# Patient Record
Sex: FEMALE | URBAN - METROPOLITAN AREA
[De-identification: names, ages, dates, MRNs, and addresses within clinical notes are randomized per-mention and may not be internally consistent; named-entity substitution may affect disease eponyms.]

---

## 2021-02-04 ENCOUNTER — OFFICE (OUTPATIENT)
Dept: URBAN - METROPOLITAN AREA CLINIC 5 | Facility: CLINIC | Age: 36
End: 2021-02-04
Payer: OTHER GOVERNMENT

## 2021-02-04 VITALS — HEIGHT: 67 IN | WEIGHT: 136 LBS

## 2021-02-04 DIAGNOSIS — R10.11 RIGHT UPPER QUADRANT PAIN: ICD-10-CM

## 2021-02-04 PROCEDURE — 99203 OFFICE O/P NEW LOW 30 MIN: CPT | Performed by: INTERNAL MEDICINE

## 2021-02-04 RX ORDER — PANTOPRAZOLE SODIUM 40 MG/1
40 TABLET, DELAYED RELEASE ORAL
Qty: 30 | Refills: 6 | Status: COMPLETED
Start: 2021-02-04 | End: 2021-07-15

## 2021-04-09 VITALS
DIASTOLIC BLOOD PRESSURE: 90 MMHG | SYSTOLIC BLOOD PRESSURE: 118 MMHG | HEART RATE: 95 BPM | DIASTOLIC BLOOD PRESSURE: 87 MMHG | HEART RATE: 85 BPM | SYSTOLIC BLOOD PRESSURE: 130 MMHG | HEART RATE: 114 BPM | WEIGHT: 130 LBS | DIASTOLIC BLOOD PRESSURE: 91 MMHG | OXYGEN SATURATION: 97 % | HEART RATE: 87 BPM | OXYGEN SATURATION: 100 % | RESPIRATION RATE: 11 BRPM | SYSTOLIC BLOOD PRESSURE: 108 MMHG | DIASTOLIC BLOOD PRESSURE: 66 MMHG | RESPIRATION RATE: 16 BRPM | RESPIRATION RATE: 9 BRPM | DIASTOLIC BLOOD PRESSURE: 68 MMHG | SYSTOLIC BLOOD PRESSURE: 123 MMHG | HEART RATE: 92 BPM | HEART RATE: 84 BPM | SYSTOLIC BLOOD PRESSURE: 105 MMHG | HEART RATE: 71 BPM | HEIGHT: 67 IN | DIASTOLIC BLOOD PRESSURE: 67 MMHG | RESPIRATION RATE: 13 BRPM | TEMPERATURE: 97.7 F | TEMPERATURE: 96.8 F | SYSTOLIC BLOOD PRESSURE: 129 MMHG | HEART RATE: 120 BPM

## 2021-04-13 ENCOUNTER — OFFICE (OUTPATIENT)
Dept: URBAN - METROPOLITAN AREA PATHOLOGY 4 | Facility: PATHOLOGY | Age: 36
End: 2021-04-13
Payer: OTHER GOVERNMENT

## 2021-04-13 ENCOUNTER — AMBULATORY SURGICAL CENTER (OUTPATIENT)
Dept: URBAN - METROPOLITAN AREA SURGERY 17 | Facility: SURGERY | Age: 36
End: 2021-04-13
Payer: OTHER GOVERNMENT

## 2021-04-13 DIAGNOSIS — K31.89 OTHER DISEASES OF STOMACH AND DUODENUM: ICD-10-CM

## 2021-04-13 DIAGNOSIS — K29.70 GASTRITIS, UNSPECIFIED, WITHOUT BLEEDING: ICD-10-CM

## 2021-04-13 DIAGNOSIS — R10.11 RIGHT UPPER QUADRANT PAIN: ICD-10-CM

## 2021-04-13 LAB
GI HISTOLOGY: A. UNSPECIFIED: (no result)
GI HISTOLOGY: B. UNSPECIFIED: (no result)
GI HISTOLOGY: C. UNSPECIFIED: (no result)
GI HISTOLOGY: PDF REPORT: (no result)

## 2021-04-13 PROCEDURE — 88305 TISSUE EXAM BY PATHOLOGIST: CPT | Performed by: INTERNAL MEDICINE

## 2021-04-13 PROCEDURE — 43239 EGD BIOPSY SINGLE/MULTIPLE: CPT | Performed by: INTERNAL MEDICINE

## 2021-04-13 PROCEDURE — 88342 IMHCHEM/IMCYTCHM 1ST ANTB: CPT | Performed by: INTERNAL MEDICINE

## 2021-05-04 ENCOUNTER — HOSPITAL ENCOUNTER (OUTPATIENT)
Dept: CT IMAGING | Facility: HOSPITAL | Age: 36
Discharge: HOME | End: 2021-05-04

## 2021-07-15 ENCOUNTER — OFFICE (OUTPATIENT)
Dept: URBAN - METROPOLITAN AREA CLINIC 75 | Facility: CLINIC | Age: 36
End: 2021-07-15
Payer: OTHER GOVERNMENT

## 2021-07-15 VITALS
HEART RATE: 73 BPM | RESPIRATION RATE: 12 BRPM | OXYGEN SATURATION: 98 % | HEIGHT: 67 IN | TEMPERATURE: 97 F | DIASTOLIC BLOOD PRESSURE: 72 MMHG | SYSTOLIC BLOOD PRESSURE: 102 MMHG | WEIGHT: 122 LBS

## 2021-07-15 DIAGNOSIS — R10.11 RIGHT UPPER QUADRANT PAIN: ICD-10-CM

## 2021-07-15 PROCEDURE — 99213 OFFICE O/P EST LOW 20 MIN: CPT | Performed by: INTERNAL MEDICINE

## 2021-09-16 ENCOUNTER — INITIAL PRENATAL (OUTPATIENT)
Dept: OBSTETRICS AND GYNECOLOGY | Facility: CLINIC | Age: 36
End: 2021-09-16

## 2021-09-16 VITALS
WEIGHT: 131 LBS | BODY MASS INDEX: 20.56 KG/M2 | HEIGHT: 67 IN | SYSTOLIC BLOOD PRESSURE: 110 MMHG | DIASTOLIC BLOOD PRESSURE: 70 MMHG

## 2021-09-16 DIAGNOSIS — Z34.80 SUPERVISION OF OTHER NORMAL PREGNANCY, ANTEPARTUM: ICD-10-CM

## 2021-09-16 DIAGNOSIS — O09.529 ANTEPARTUM MULTIGRAVIDA OF ADVANCED MATERNAL AGE: ICD-10-CM

## 2021-09-16 DIAGNOSIS — Z34.80 SUPERVISION OF OTHER NORMAL PREGNANCY: ICD-10-CM

## 2021-09-16 DIAGNOSIS — Z32.01 PREGNANCY TEST PERFORMED, PREGNANCY CONFIRMED: Primary | ICD-10-CM

## 2021-09-16 LAB
ABO GROUP BLD: NORMAL
B-HCG UR QL: POSITIVE
BASOPHILS # BLD AUTO: 0.04 10*3/MM3 (ref 0–0.2)
BASOPHILS NFR BLD AUTO: 0.4 % (ref 0–1.5)
BLD GP AB SCN SERPL QL: NEGATIVE
C TRACH RRNA CVX QL NAA+PROBE: NOT DETECTED
DEPRECATED RDW RBC AUTO: 42.8 FL (ref 37–54)
EOSINOPHIL # BLD AUTO: 0.18 10*3/MM3 (ref 0–0.4)
EOSINOPHIL NFR BLD AUTO: 1.6 % (ref 0.3–6.2)
ERYTHROCYTE [DISTWIDTH] IN BLOOD BY AUTOMATED COUNT: 13.2 % (ref 12.3–15.4)
GLUCOSE UR STRIP-MCNC: NEGATIVE MG/DL
HBV SURFACE AG SERPL QL IA: NORMAL
HCT VFR BLD AUTO: 41.3 % (ref 34–46.6)
HCV AB SER DONR QL: NORMAL
HGB BLD-MCNC: 13.5 G/DL (ref 12–15.9)
HIV1+2 AB SER QL: NORMAL
IMM GRANULOCYTES # BLD AUTO: 0.04 10*3/MM3 (ref 0–0.05)
IMM GRANULOCYTES NFR BLD AUTO: 0.4 % (ref 0–0.5)
INTERNAL NEGATIVE CONTROL: ABNORMAL
INTERNAL POSITIVE CONTROL: ABNORMAL
LYMPHOCYTES # BLD AUTO: 1.96 10*3/MM3 (ref 0.7–3.1)
LYMPHOCYTES NFR BLD AUTO: 17.6 % (ref 19.6–45.3)
Lab: ABNORMAL
MCH RBC QN AUTO: 29.1 PG (ref 26.6–33)
MCHC RBC AUTO-ENTMCNC: 32.7 G/DL (ref 31.5–35.7)
MCV RBC AUTO: 89 FL (ref 79–97)
MONOCYTES # BLD AUTO: 0.57 10*3/MM3 (ref 0.1–0.9)
MONOCYTES NFR BLD AUTO: 5.1 % (ref 5–12)
N GONORRHOEA RRNA SPEC QL NAA+PROBE: NOT DETECTED
NEUTROPHILS NFR BLD AUTO: 74.9 % (ref 42.7–76)
NEUTROPHILS NFR BLD AUTO: 8.36 10*3/MM3 (ref 1.7–7)
NRBC BLD AUTO-RTO: 0 /100 WBC (ref 0–0.2)
PLATELET # BLD AUTO: 370 10*3/MM3 (ref 140–450)
PMV BLD AUTO: 9.6 FL (ref 6–12)
PROT UR STRIP-MCNC: NEGATIVE MG/DL
RBC # BLD AUTO: 4.64 10*6/MM3 (ref 3.77–5.28)
RH BLD: POSITIVE
WBC # BLD AUTO: 11.15 10*3/MM3 (ref 3.4–10.8)

## 2021-09-16 PROCEDURE — 87491 CHLMYD TRACH DNA AMP PROBE: CPT | Performed by: STUDENT IN AN ORGANIZED HEALTH CARE EDUCATION/TRAINING PROGRAM

## 2021-09-16 PROCEDURE — 0501F PRENATAL FLOW SHEET: CPT | Performed by: STUDENT IN AN ORGANIZED HEALTH CARE EDUCATION/TRAINING PROGRAM

## 2021-09-16 PROCEDURE — 86803 HEPATITIS C AB TEST: CPT | Performed by: STUDENT IN AN ORGANIZED HEALTH CARE EDUCATION/TRAINING PROGRAM

## 2021-09-16 PROCEDURE — 81025 URINE PREGNANCY TEST: CPT | Performed by: STUDENT IN AN ORGANIZED HEALTH CARE EDUCATION/TRAINING PROGRAM

## 2021-09-16 PROCEDURE — G0432 EIA HIV-1/HIV-2 SCREEN: HCPCS | Performed by: STUDENT IN AN ORGANIZED HEALTH CARE EDUCATION/TRAINING PROGRAM

## 2021-09-16 PROCEDURE — 87086 URINE CULTURE/COLONY COUNT: CPT | Performed by: STUDENT IN AN ORGANIZED HEALTH CARE EDUCATION/TRAINING PROGRAM

## 2021-09-16 PROCEDURE — 87591 N.GONORRHOEAE DNA AMP PROB: CPT | Performed by: STUDENT IN AN ORGANIZED HEALTH CARE EDUCATION/TRAINING PROGRAM

## 2021-09-16 NOTE — PROGRESS NOTES
"OB Initial Visit    CC- Here for care of current pregnancy     GAEL Finalized: Will get early dating scan.    Subjective:  36 y.o.  presenting for her first obstetrical visit.    LMP: Patient's last menstrual period was 2021. sure    COMPLAINS OF: Pt has no complaints, is doing well    Niesha is a 36-year-old -0-1-3 presented today for her first obstetrical visit.  Patient found out she was pregnant incidentally after taking pregnancy test at home with missed menses.  She denies any complications to date.  No vaginal bleeding, no vaginal discharge, denies nausea or vomiting.  Does have occasional lightheadedness when standing however denies any headaches and resolves after short period time.  This is her fifth pregnancy, she has delivered 3 term spontaneous vaginal deliveries and had 1 early miscarriage at 5 weeks between her second and third child.  Her family is in the  and she has recently moved back from [facility].  She denies any complications in those previous pregnancies and antepartum timeframe or afterwards.  She has been taking a prenatal vitamin.  She denies any significant history medically, or surgically.     Reviewed and updated:  OBHx, GYNHx (STDs), PMHx, Medications, Allergies, PSHx, Social Hx, Preventative Hx (PAP), Hx of abuse/safe environment, Vaccine Hx including hx of chickenpox or vaccine, Genetic Hx (pt, FOB, both families).        Objective:  /70   Ht 170.2 cm (67\")   Wt 59.4 kg (131 lb)   LMP 2021   BMI 20.52 kg/m²   General- NAD, alert and oriented, appropriate  Psych- Normal mood, good memory  Neck- No masses, no thyroid enlargement  CV- Regular rhythm, no murnurs  Resp- CTA to bases, no wheezes  Abdomen- Soft, non distended, non tender, no masses     Breast left- No mass, non tender, no nipple discharge  Breast right- No mass, non tender, no nipple discharge    External genitalia- Normal, no lesions  Urethra- Normal, no masses, non " tender  Vagina- Normal, no discharge  Bladder- Normal, no masses, non tender  Cvx- Normal, no lesions, no discharge, no CMT  Uterus- Normal shape and consistency, non tender  Adnexa- Normal, no mass, non tender    Lymphatic- No palpable neck, axillary, or groin nodes  Ext- No edema, no cyanosis    Skin- No lesions, no rashes, no acanthosis nigricans      Assessment and Plan:  Impression  1.  First trimester pregnancy  2.  History of 3 prior spontaneous vaginal deliveries at term  3.  Advanced maternal age    Plan    Diagnoses and all orders for this visit:    1. Pregnancy test performed, pregnancy confirmed (Primary)  -     POC Pregnancy, Urine    2. Supervision of other normal pregnancy  -     POC Urinalysis Dipstick  -     Urine Culture - Urine, Urine, Clean Catch  -     OB Panel With HIV  -     Chlamydia / GC, DNA Probe - Swab, Cervix, Vagina  -     US Ob < 14 Weeks Single or First Gestation; Future    3. Supervision of other normal pregnancy, antepartum  Overview:  Initial visit:  • PNL: Drawn   • PAP/GC/CT/Urine culture: \ normal history of Pap smear last 2018 in [place].  Patient to provide records.  • Blood type:    Genetic testing:    • Declined genetic testing, discussed increased risk of aneuploidy with advanced maternal age.    Vaccinations:  • COVID: History of Covid, declines vaccination at this time  • Influenza:   • Tdap:    24-28 weeks:  • 1hr GCT:  • Hct/Plt:  • Tdap Rx  • Rhogam:     Third Trimester:  • GBS  • Breast pump:    Ultrasound:  • Dating: Dating ultrasound ordered  • Anatomy:   • Follow up:     PLAN:  2021 routine prenatal care at this time.  Consider  testing at 32 weeks for advanced maternal age            4. Antepartum multigravida of advanced maternal age    Other orders  -     Cancel: IGP,CtNgTv,rfx Aptima HPV ASCU          13w1d    Genetic Screening: Declined genetic testing after counseling of increased risk for breast maternal age.    Vaccines: APVACCINEHX:  Patient with history of Covid, not interested in vaccination at this time.    Counseling: Nutrition discussed, calories, activity/exercise in pregnancy, Discussed dietary restrictions/safety food preparation in pregnancy, Reviewed what to expect prenatal visits, office providers, Appropriate trimester precautions provided, N/V, vag bleeding, cramping, Questions answered            Alejandro Crews MD  09/16/2021

## 2021-09-17 ENCOUNTER — TELEPHONE (OUTPATIENT)
Dept: OBSTETRICS AND GYNECOLOGY | Facility: CLINIC | Age: 36
End: 2021-09-17

## 2021-09-17 DIAGNOSIS — O26.842 UTERINE SIZE-DATE DISCREPANCY IN SECOND TRIMESTER: Primary | ICD-10-CM

## 2021-09-17 LAB
BACTERIA SPEC AEROBE CULT: NO GROWTH
RPR SER QL: NORMAL
RUBV IGG SERPL IA-ACNC: 1 INDEX

## 2021-09-27 ENCOUNTER — HOSPITAL ENCOUNTER (OUTPATIENT)
Dept: ULTRASOUND IMAGING | Facility: HOSPITAL | Age: 36
Discharge: HOME | End: 2021-09-27
Admitting: STUDENT IN AN ORGANIZED HEALTH CARE EDUCATION/TRAINING PROGRAM

## 2021-09-27 DIAGNOSIS — O26.842 UTERINE SIZE-DATE DISCREPANCY IN SECOND TRIMESTER: ICD-10-CM

## 2021-09-27 PROCEDURE — 76805 OB US >/= 14 WKS SNGL FETUS: CPT

## 2021-10-14 ENCOUNTER — ROUTINE PRENATAL (OUTPATIENT)
Dept: OBSTETRICS AND GYNECOLOGY | Facility: CLINIC | Age: 36
End: 2021-10-14

## 2021-10-14 VITALS — SYSTOLIC BLOOD PRESSURE: 111 MMHG | DIASTOLIC BLOOD PRESSURE: 76 MMHG | BODY MASS INDEX: 21.14 KG/M2 | WEIGHT: 135 LBS

## 2021-10-14 DIAGNOSIS — Z3A.16 16 WEEKS GESTATION OF PREGNANCY: Primary | ICD-10-CM

## 2021-10-14 DIAGNOSIS — O09.522 MULTIGRAVIDA OF ADVANCED MATERNAL AGE IN SECOND TRIMESTER: ICD-10-CM

## 2021-10-14 DIAGNOSIS — Z34.80 SUPERVISION OF OTHER NORMAL PREGNANCY, ANTEPARTUM: ICD-10-CM

## 2021-10-14 LAB
GLUCOSE UR STRIP-MCNC: NEGATIVE MG/DL
PROT UR STRIP-MCNC: NEGATIVE MG/DL

## 2021-10-14 PROCEDURE — 0502F SUBSEQUENT PRENATAL CARE: CPT | Performed by: STUDENT IN AN ORGANIZED HEALTH CARE EDUCATION/TRAINING PROGRAM

## 2021-10-14 NOTE — PROGRESS NOTES
OB FOLLOW UP  Complaint   Chief Complaint   Patient presents with   • Routine Prenatal Visit            Niesha Ro is a 36 y.o.  18w2d patient being seen today for her obstetrical follow up visit. Patient denies decreased fetal movement, contractions, loss of fluid or vaginal bleeding.  No other acute complaints.     Her prenatal care is complicated by (and status) :    Patient Active Problem List   Diagnosis   • Supervision of other normal pregnancy, antepartum   • AMA (advanced maternal age) multigravida 35+       All other systems reviewed and are negative.     The additional following portions of the patient's history were reviewed and updated as appropriate: allergies, current medications, past family history, past medical history, past social history, past surgical history and problem list.      EXAM:     Vital signs: /76   Wt 61.2 kg (135 lb)   LMP 2021   BMI 21.14 kg/m²   Appearance/psychiatric: To be in no distress  Constitutional: The patient is well nourished.  Cardiovascular: She does not have edema.  Respiratory: Respiratory effort is normal.  Gastrointestinal: Abdomen is soft, gravid, nontender, no rashes, heart tones are present, fundal height is size equals dates    Pelvic Exam: No    Urine glucose/protein: See prenatal flowsheet       Assessment and Plan    Problem List Items Addressed This Visit        Other    AMA (advanced maternal age) multigravida 35+    Supervision of other normal pregnancy, antepartum    Overview     Initial visit:  • PNL: wnl  • PAP/GC/CT/Urine culture: NILM 2018 in [place].  Patient to provide records. GCCT neg; NGTD  • Blood type:A+/neg    Genetic testing:    • Declined genetic testing, discussed increased risk of aneuploidy with advanced maternal age.    Vaccinations:  • COVID: History of Covid, declines vaccination at this time  • Influenza:   • Tdap:    24-28 weeks:  • 1hr GCT:  • Hct/Plt:  • Tdap Rx  • Rhogam:     Third  Trimester:  • GBS  • Breast pump:    Ultrasound:  • Dating: U/S at 14w5d, >7 days difference. Dating by 14 week U/S  • Anatomy:   • Follow up:     PLAN:  2021 routine prenatal care at this time.  Consider  testing at 32 weeks for advanced maternal age                 Other Visit Diagnoses     16 weeks gestation of pregnancy    -  Primary    Relevant Orders    POC Urinalysis Dipstick (Completed)    US Ob Detail Fetal Anatomy Single or First Gestation          Impression  1. Pregnancy at 18w2d  2. Fetal status reassuring.   3. Activity and Exercise discussed.    Plan  1. Anatomy scan ordered  2. Review of dating scan with patient with change from LMP to U/S      Patient was counseled to the following pregnancy precautions:  • Decreased fetal movement, if concern for decreased fetal movement please perform fetal kick counts you are looking for 10 movements in 2 hours.  If concern for fetal movement and not meeting that criteria, please present to triage for evaluation.  • Contractions occurring every 5 minutes for over an hour, lasting 30 to 60 seconds and progressively causing more discomfort, please seek medical attention to rule out labor  • If you believe that your water is broken, place a sanitary pad.  If pad fills in short period of time i.e. less than 5 minutes, take off pad placed another pad.  If this is saturated please present for rule out rupture of membranes  • Vaginal bleeding can be normal in pregnancy, this usually takes a form of spotting.  If having heavier bleeding like a menstrual period please present for evaluation; especially in light of severe abdominal pain this could represent a placental abruption.  • Keep all scheduled appointments as recommended.    Follow up in 4 weeks    Alejandro Crews MD  10/14/2021

## 2021-11-11 ENCOUNTER — ROUTINE PRENATAL (OUTPATIENT)
Dept: OBSTETRICS AND GYNECOLOGY | Facility: CLINIC | Age: 36
End: 2021-11-11

## 2021-11-11 VITALS — WEIGHT: 138 LBS | BODY MASS INDEX: 21.61 KG/M2 | SYSTOLIC BLOOD PRESSURE: 109 MMHG | DIASTOLIC BLOOD PRESSURE: 73 MMHG

## 2021-11-11 DIAGNOSIS — Z34.80 SUPERVISION OF OTHER NORMAL PREGNANCY, ANTEPARTUM: Primary | ICD-10-CM

## 2021-11-11 DIAGNOSIS — O09.522 MULTIGRAVIDA OF ADVANCED MATERNAL AGE IN SECOND TRIMESTER: ICD-10-CM

## 2021-11-11 PROBLEM — R42 DIZZINESS: Status: ACTIVE | Noted: 2021-11-11

## 2021-11-11 LAB
GLUCOSE UR STRIP-MCNC: NEGATIVE MG/DL
PROT UR STRIP-MCNC: NEGATIVE MG/DL

## 2021-11-11 PROCEDURE — 0502F SUBSEQUENT PRENATAL CARE: CPT | Performed by: STUDENT IN AN ORGANIZED HEALTH CARE EDUCATION/TRAINING PROGRAM

## 2021-11-11 RX ORDER — PRENATAL VIT/IRON FUM/FOLIC AC 27MG-0.8MG
1 TABLET ORAL DAILY
COMMUNITY

## 2021-11-11 NOTE — PROGRESS NOTES
OB FOLLOW UP  Complaint   Chief Complaint   Patient presents with   • Routine Prenatal Visit            Niesha Ro is a 36 y.o.  22w2d patient being seen today for her obstetrical follow up visit. Patient denies decreased fetal movement, contractions, loss of fluid or vaginal bleeding.  Episodes of dizziness, resolved without intervention.    Her prenatal care is complicated by (and status) :    Patient Active Problem List   Diagnosis   • Supervision of other normal pregnancy, antepartum   • AMA (advanced maternal age) multigravida 35+       All other systems reviewed and are negative.     The additional following portions of the patient's history were reviewed and updated as appropriate: allergies, current medications, past family history, past medical history, past social history, past surgical history and problem list.      EXAM:     Vital signs: /73   Wt 62.6 kg (138 lb)   LMP 2021   BMI 21.61 kg/m²   Appearance/psychiatric: To be in no distress  Constitutional: The patient is well nourished.  Cardiovascular: She does not have edema.  Respiratory: Respiratory effort is normal.  Gastrointestinal: Abdomen is soft, gravid, nontender, no rashes, heart tones are present, fundal height is size equals dates    Pelvic Exam: No    Urine glucose/protein: See prenatal flowsheet       Assessment and Plan    Problem List Items Addressed This Visit        Gravid and     Supervision of other normal pregnancy, antepartum - Primary    Overview     Initial visit:  • PNL: wnl  • PAP/GC/CT/Urine culture: NILM 2018 in [place].  Patient to provide records. GCCT neg; NGTD  • Blood type:A+/neg    Genetic testing:    • Declined genetic testing, discussed increased risk of aneuploidy with advanced maternal age.    Vaccinations:  • COVID: History of Covid, declines vaccination at this time  • Influenza:   • Tdap:    24-28 weeks:  • 1hr GCT:  • Hct/Plt:  • Tdap Rx  • Rhogam:     Third  Trimester:  • GBS  • Breast pump:    Ultrasound:  • Dating: U/S at 14w5d, >7 days difference. Dating by 14 week U/S  • Anatomy:  2021 EFW 34th percentile; normal anatomy, XY, anterior placenta, cephalic three-vessel cord CRYSTAL within normal limits.  Cervical length 47 mm no funneling  • Follow up:     PLAN:  2021 routine prenatal care at this time.  Consider  testing at 32 weeks for advanced maternal age               Relevant Orders    POC Urinalysis Dipstick (Completed)          Impression  1. Pregnancy at 22w2d  2. Fetal status reassuring.   3. Activity and Exercise discussed.    Plan  1.  Review of anatomy ultrasound with patient, have a baby boy.  2.  Recommend hydration and keeping small snack with patient.  To present for evaluation of have loss of consciousness with dizziness spells.      Patient was counseled to the following pregnancy precautions:  • Decreased fetal movement, if concern for decreased fetal movement please perform fetal kick counts you are looking for 10 movements in 2 hours.  If concern for fetal movement and not meeting that criteria, please present to triage for evaluation.  • Contractions occurring every 5 minutes for over an hour, lasting 30 to 60 seconds and progressively causing more discomfort, please seek medical attention to rule out labor  • If you believe that your water is broken, place a sanitary pad.  If pad fills in short period of time i.e. less than 5 minutes, take off pad placed another pad.  If this is saturated please present for rule out rupture of membranes  • Vaginal bleeding can be normal in pregnancy, this usually takes a form of spotting.  If having heavier bleeding like a menstrual period please present for evaluation; especially in light of severe abdominal pain this could represent a placental abruption.  • Keep all scheduled appointments as recommended.        Alejandro Crews MD  2021

## 2021-12-06 ENCOUNTER — ROUTINE PRENATAL (OUTPATIENT)
Dept: OBSTETRICS AND GYNECOLOGY | Facility: CLINIC | Age: 36
End: 2021-12-06

## 2021-12-06 VITALS — DIASTOLIC BLOOD PRESSURE: 85 MMHG | SYSTOLIC BLOOD PRESSURE: 124 MMHG | BODY MASS INDEX: 22.55 KG/M2 | WEIGHT: 144 LBS

## 2021-12-06 DIAGNOSIS — O09.522 MULTIGRAVIDA OF ADVANCED MATERNAL AGE IN SECOND TRIMESTER: ICD-10-CM

## 2021-12-06 DIAGNOSIS — R42 DIZZINESS: ICD-10-CM

## 2021-12-06 DIAGNOSIS — Z34.80 SUPERVISION OF OTHER NORMAL PREGNANCY, ANTEPARTUM: ICD-10-CM

## 2021-12-06 LAB
DEPRECATED RDW RBC AUTO: 40.6 FL (ref 37–54)
ERYTHROCYTE [DISTWIDTH] IN BLOOD BY AUTOMATED COUNT: 12.7 % (ref 12.3–15.4)
GLUCOSE 1H P GLC SERPL-MCNC: 82 MG/DL (ref 65–139)
GLUCOSE UR STRIP-MCNC: NEGATIVE MG/DL
HCT VFR BLD AUTO: 33.3 % (ref 34–46.6)
HGB BLD-MCNC: 11.4 G/DL (ref 12–15.9)
MCH RBC QN AUTO: 30.1 PG (ref 26.6–33)
MCHC RBC AUTO-ENTMCNC: 34.2 G/DL (ref 31.5–35.7)
MCV RBC AUTO: 87.9 FL (ref 79–97)
PLATELET # BLD AUTO: 315 10*3/MM3 (ref 140–450)
PMV BLD AUTO: 9.7 FL (ref 6–12)
PROT UR STRIP-MCNC: ABNORMAL MG/DL
RBC # BLD AUTO: 3.79 10*6/MM3 (ref 3.77–5.28)
WBC NRBC COR # BLD: 10.42 10*3/MM3 (ref 3.4–10.8)

## 2021-12-06 PROCEDURE — 0502F SUBSEQUENT PRENATAL CARE: CPT | Performed by: OBSTETRICS & GYNECOLOGY

## 2021-12-06 PROCEDURE — 82950 GLUCOSE TEST: CPT | Performed by: OBSTETRICS & GYNECOLOGY

## 2021-12-06 PROCEDURE — 85027 COMPLETE CBC AUTOMATED: CPT | Performed by: OBSTETRICS & GYNECOLOGY

## 2021-12-08 NOTE — PROGRESS NOTES
OB FOLLOW UP  CC- Here for care of pregnancy        Niesha Ro is a 36 y.o.  26w0d patient being seen today for her obstetrical follow up visit. Patient reports no complaints and Pt very upset today re EDC. Pt states she is sure of her LMP. She states that her  travels frequently so she is certain of her date of conception.  Her dates were changed by her ultrasound last visit and she is very concerned as she does not desire induction and she always goes to her due date.  She is concerned if the new EDC is used she will be called post dates and informed of need for induction if she goes to the date that she believes is her EDC.    Her prenatal care is complicated by (and status) :    Patient Active Problem List   Diagnosis   • Supervision of other normal pregnancy, antepartum   • AMA (advanced maternal age) multigravida 35+   • Dizziness       Flu Status: Declines during pregnancy  Covid Status:Declines during pregnancy    ROS -   Patient Reports : No Problems  Patient Denies: Loss of Fluid, Vaginal Spotting, Vision Changes, Headaches, Nausea , Vomiting , Contractions and Epigastric pain  Fetal Movement : normal  All other systems reviewed and are negative.       The additional following portions of the patient's history were reviewed and updated as appropriate: allergies, current medications, past family history, past medical history, past social history, past surgical history and problem list.    I have reviewed and agree with the HPI, ROS, and historical information as entered above. Brit Elias, DO    /85   Wt 65.3 kg (144 lb)   LMP 2021   BMI 22.55 kg/m²       EXAM:     FHT: 162 BPM   Uterine Size: size equals dates  Pelvic Exam: No    Urine glucose/protein: See prenatal flowsheet       Assessment and Plan  Diagnoses and all orders for this visit:    1. Supervision of other normal pregnancy, antepartum  Overview:  Initial visit:  • PNL: wnl  • PAP/GC/CT/Urine culture:  MARQUIS 2018 in [place].  Patient to provide records. GCCT neg; NGTD  • Blood type:A+/neg    Genetic testing:    • Declined genetic testing, discussed increased risk of aneuploidy with advanced maternal age.    Vaccinations:  • COVID: History of Covid, declines vaccination at this time  • Influenza:   • Tdap:    24-28 weeks:  • 1hr GCT:  • Hct/Plt:  • Tdap Rx  • Rhogam:     Third Trimester:  • GBS  • Breast pump:    Ultrasound:  • Dating: U/S at 14w5d, >7 days difference. Dating by 14 week U/S  • Anatomy:  2021 EFW 34th percentile; normal anatomy, XY, anterior placenta, cephalic three-vessel cord CRYSTAL within normal limits.  Cervical length 47 mm no funneling  • Follow up:     PLAN:  2021 routine prenatal care at this time.  Consider  testing at 32 weeks for advanced maternal age          Orders:  -     CBC (No Diff)  -     Gestational Diabetes Screen  -     POC Urinalysis Dipstick    2. Dizziness  Overview:  2021 occasional episodes of dizziness, not related to positional changes.  Will come out of the blue.  Resolved without intervention.  Continue to monitor.      3. Multigravida of advanced maternal age in second trimester  -     POC Urinalysis Dipstick         1. Pregnancy at 26w0d  2. Fetal status reassuring.   3. Activity and Exercise discussed.  4. Return in about 4 weeks (around 1/3/2022).   5. 1 hour gtt, cbc today  6. Will use EDC of LMP 3/23/22 as opposed to 3/15/22 which is 8 days earlier. I think this is reasonable.    Brit Elias, DO  2021

## 2021-12-13 ENCOUNTER — TELEPHONE (OUTPATIENT)
Dept: OBSTETRICS AND GYNECOLOGY | Facility: CLINIC | Age: 36
End: 2021-12-13

## 2021-12-13 NOTE — TELEPHONE ENCOUNTER
----- Message from Niesha Ro sent at 12/13/2021 10:53 AM EST -----  Regarding: Supplements?  Good morning! I was just reaching out due to the CBC last collected at my appt. I noticed my hemoglobin is a little lower than normal and wanted to see if this could mean an iron deficiency? I am tired most of the day and sometimes get out of breathe more easily than I think I should but always contribute that to the normal pregnancy feelings. I was just wondering if you think I would need to supplement with any iron based on those numbers or if you think at my next appointment I should get iron levels checked? I appreciate your time and have a great day!!

## 2021-12-13 NOTE — TELEPHONE ENCOUNTER
Patient informed Dr. Elias had reviewed her labs and did not order supplementation at this time. Discussed with patient adding iron rich foods to her diet and taking time to rest during the day. She knows to call if further issues or to discuss at her next appointment.

## 2022-01-03 ENCOUNTER — ROUTINE PRENATAL (OUTPATIENT)
Dept: OBSTETRICS AND GYNECOLOGY | Facility: CLINIC | Age: 37
End: 2022-01-03

## 2022-01-03 VITALS — SYSTOLIC BLOOD PRESSURE: 104 MMHG | BODY MASS INDEX: 23.15 KG/M2 | DIASTOLIC BLOOD PRESSURE: 65 MMHG | WEIGHT: 147.8 LBS

## 2022-01-03 DIAGNOSIS — Z34.80 SUPERVISION OF OTHER NORMAL PREGNANCY, ANTEPARTUM: Primary | ICD-10-CM

## 2022-01-03 DIAGNOSIS — L29.9 PRURITUS: ICD-10-CM

## 2022-01-03 LAB
ALBUMIN SERPL-MCNC: 4.2 G/DL (ref 3.5–5.2)
ALP SERPL-CCNC: 80 U/L (ref 39–117)
ALT SERPL W P-5'-P-CCNC: 9 U/L (ref 1–33)
AST SERPL-CCNC: 15 U/L (ref 1–32)
BILIRUB CONJ SERPL-MCNC: <0.2 MG/DL (ref 0–0.3)
BILIRUB INDIRECT SERPL-MCNC: NORMAL MG/DL
BILIRUB SERPL-MCNC: <0.2 MG/DL (ref 0–1.2)
GLUCOSE UR STRIP-MCNC: NEGATIVE MG/DL
PROT SERPL-MCNC: 6.8 G/DL (ref 6–8.5)
PROT UR STRIP-MCNC: ABNORMAL MG/DL

## 2022-01-03 PROCEDURE — 82239 BILE ACIDS TOTAL: CPT | Performed by: OBSTETRICS & GYNECOLOGY

## 2022-01-03 PROCEDURE — 80076 HEPATIC FUNCTION PANEL: CPT | Performed by: OBSTETRICS & GYNECOLOGY

## 2022-01-03 PROCEDURE — 0502F SUBSEQUENT PRENATAL CARE: CPT | Performed by: OBSTETRICS & GYNECOLOGY

## 2022-01-03 RX ORDER — HYDROXYZINE PAMOATE 25 MG/1
25 CAPSULE ORAL 3 TIMES DAILY PRN
Qty: 30 CAPSULE | Refills: 1 | Status: SHIPPED | OUTPATIENT
Start: 2022-01-03 | End: 2022-03-08

## 2022-01-03 RX ORDER — CETIRIZINE HYDROCHLORIDE 10 MG/1
1 TABLET ORAL DAILY PRN
COMMUNITY
Start: 2021-12-27 | End: 2022-01-03

## 2022-01-03 RX ORDER — FERROUS SULFATE 325(65) MG
325 TABLET ORAL EVERY OTHER DAY
Qty: 30 TABLET | Refills: 1 | Status: SHIPPED | OUTPATIENT
Start: 2022-01-03 | End: 2022-03-10 | Stop reason: HOSPADM

## 2022-01-03 NOTE — PROGRESS NOTES
OB FOLLOW UP  CC- Here for care of pregnancy        Niesha Ro is a 36 y.o.  29w6d patient being seen today for her obstetrical follow up visit. Patient reports co itching since 21. Itching behind neck, back on belly and arms and legs.  not really iching on palms and soles.     Her prenatal care is complicated by (and status) :    Patient Active Problem List   Diagnosis   • Supervision of other normal pregnancy, antepartum   • AMA (advanced maternal age) multigravida 35+   • Dizziness   • Pruritus       Flu Status: Declines  Covid Status:    ROS -   Patient Reports : itching  Patient Denies: Loss of Fluid, Vaginal Spotting, Vision Changes, Headaches, Nausea , Vomiting , Contractions and Epigastric pain  Fetal Movement : normal  All other systems reviewed and are negative.       The additional following portions of the patient's history were reviewed and updated as appropriate: allergies, current medications, past family history, past medical history, past social history, past surgical history and problem list.    I have reviewed and agree with the HPI, ROS, and historical information as entered above. Brit Elias, DO    /65   Wt 67 kg (147 lb 12.8 oz)   LMP 2021   BMI 23.15 kg/m²       EXAM:     FHT: 163 BPM   Uterine Size: 28 cm  Pelvic Exam: No    Urine glucose/protein: See prenatal flowsheet       Assessment and Plan  Diagnoses and all orders for this visit:    1. Supervision of other normal pregnancy, antepartum (Primary)  Overview:  Initial visit:  • PNL: wnl  • PAP/GC/CT/Urine culture: NILM 2018 in [place].  Patient to provide records. GCCT neg; NGTD  • Blood type:A+/neg    Genetic testing:    • Declined genetic testing, discussed increased risk of aneuploidy with advanced maternal age.    Vaccinations:  • COVID: History of Covid, declines vaccination at this time  • Influenza:   • Tdap:    24-28 weeks:  • 1hr GCT:  82  • Hct/Plt:  33.3/313  • Tdap Rx  • Rhogam: Rh +  not indicated    Third Trimester:  • GBS  • Breast pump:    Ultrasound:  • Dating: U/S at 14w5d, >7 days difference. Dating by 14 week U/S  • Anatomy:  2021 EFW 34th percentile; normal anatomy, XY, anterior placenta, cephalic three-vessel cord CRYSTAL within normal limits.  Cervical length 47 mm no funneling  • Follow up:     PLAN:  2021 routine prenatal care at this time.  Consider  testing at 32 weeks for advanced maternal age          Orders:  -     POC Urinalysis Dipstick  -     ferrous sulfate 325 (65 FE) MG tablet; Take 1 tablet by mouth Every Other Day.  Dispense: 30 tablet; Refill: 1  -     Bile Acids, Total  -     Hepatic Function Panel    2. Pruritus  -     Bile Acids, Total  -     Hepatic Function Panel    Other orders  -     hydrOXYzine pamoate (Vistaril) 25 MG capsule; Take 1 capsule by mouth 3 (Three) Times a Day As Needed for Itching.  Dispense: 30 capsule; Refill: 1       1. Pregnancy at 29w6d  2. Fetal status reassuring.   3. Activity and Exercise discussed.  Return in about 2 weeks (around 2022).    Brit Elias,   2022

## 2022-01-05 LAB — BILE AC SERPL-SCNC: 3 UMOL/L (ref 0–10)

## 2022-01-17 ENCOUNTER — ROUTINE PRENATAL (OUTPATIENT)
Dept: OBSTETRICS AND GYNECOLOGY | Facility: CLINIC | Age: 37
End: 2022-01-17

## 2022-01-17 ENCOUNTER — TELEPHONE (OUTPATIENT)
Dept: OBSTETRICS AND GYNECOLOGY | Facility: CLINIC | Age: 37
End: 2022-01-17

## 2022-01-17 VITALS — WEIGHT: 149 LBS | BODY MASS INDEX: 23.34 KG/M2 | SYSTOLIC BLOOD PRESSURE: 115 MMHG | DIASTOLIC BLOOD PRESSURE: 70 MMHG

## 2022-01-17 DIAGNOSIS — Z34.80 SUPERVISION OF OTHER NORMAL PREGNANCY, ANTEPARTUM: Primary | ICD-10-CM

## 2022-01-17 DIAGNOSIS — R42 DIZZINESS: ICD-10-CM

## 2022-01-17 DIAGNOSIS — O09.522 MULTIGRAVIDA OF ADVANCED MATERNAL AGE IN SECOND TRIMESTER: ICD-10-CM

## 2022-01-17 PROCEDURE — 0502F SUBSEQUENT PRENATAL CARE: CPT | Performed by: OBSTETRICS & GYNECOLOGY

## 2022-01-19 NOTE — PROGRESS NOTES
OB FOLLOW UP  CC- Here for care of pregnancy        Niesha Ro is a 36 y.o.  32w0d patient being seen today for her obstetrical follow up visit. Patient reports no complaints and itching much better.  Rash almost all gone.     Her prenatal care is complicated by (and status) :    Patient Active Problem List   Diagnosis   • Supervision of other normal pregnancy, antepartum   • AMA (advanced maternal age) multigravida 35+   • Dizziness   • Pruritus       Flu Status: Declines  Covid Status:    ROS -   Patient Reports : No Problems  Patient Denies: Loss of Fluid, Vaginal Spotting, Vision Changes, Headaches, Nausea , Vomiting , Contractions and Epigastric pain  Fetal Movement : normal  All other systems reviewed and are negative.       The additional following portions of the patient's history were reviewed and updated as appropriate: allergies, current medications, past family history, past medical history, past social history, past surgical history and problem list.    I have reviewed and agree with the HPI, ROS, and historical information as entered above. Brit Elias, DO    /70   Wt 67.6 kg (149 lb)   LMP 2021   BMI 23.34 kg/m²       EXAM:     FHT: 144 BPM   Uterine Size: 32 cm  Pelvic Exam: No    Urine glucose/protein: See prenatal flowsheet       Assessment and Plan  Diagnoses and all orders for this visit:    1. Supervision of other normal pregnancy, antepartum (Primary)  Overview:  Initial visit:  • PNL: wnl  • PAP/GC/CT/Urine culture: NILM 2018 in [place].  Patient to provide records. GCCT neg; NGTD  • Blood type:A+/neg    Genetic testing:    • Declined genetic testing, discussed increased risk of aneuploidy with advanced maternal age.    Vaccinations:  • COVID: History of Covid, declines vaccination at this time  • Influenza:   • Tdap:    24-28 weeks:  • 1hr GCT:  82  • Hct/Plt:  33.3/313  • Tdap Rx  • Rhogam: Rh + not indicated    Third Trimester:  • GBS  • Breast  pump:    Ultrasound:  • Dating: U/S at 14w5d, >7 days difference. Dating by 14 week U/S  • Anatomy:  2021 EFW 34th percentile; normal anatomy, XY, anterior placenta, cephalic three-vessel cord CRYSTAL within normal limits.  Cervical length 47 mm no funneling  • Follow up:     PLAN:  2021 routine prenatal care at this time.  Consider  testing at 36 weeks for advanced maternal age          Orders:  -     POC Urinalysis Dipstick    2. Dizziness  Overview:  2021 occasional episodes of dizziness, not related to positional changes.  Will come out of the blue.  Resolved without intervention.  Continue to monitor.      3. Multigravida of advanced maternal age in second trimester       1. Pregnancy at 32w0d  2. Fetal status reassuring.   3. Activity and Exercise discussed.  Return for Please schedule q 2 wks 36 wk & wks til del, Please schedule sono anatomy with next visit.    Brit Elias DO  2022

## 2022-02-02 ENCOUNTER — TELEPHONE (OUTPATIENT)
Dept: OBSTETRICS AND GYNECOLOGY | Facility: CLINIC | Age: 37
End: 2022-02-02

## 2022-02-02 NOTE — TELEPHONE ENCOUNTER
Called patient message states ph# has been changed.  Need to reschedule 2-4-22 ultrasound & ro with Dr Elias

## 2022-02-10 ENCOUNTER — ROUTINE PRENATAL (OUTPATIENT)
Dept: OBSTETRICS AND GYNECOLOGY | Facility: CLINIC | Age: 37
End: 2022-02-10

## 2022-02-10 VITALS — SYSTOLIC BLOOD PRESSURE: 101 MMHG | DIASTOLIC BLOOD PRESSURE: 66 MMHG | WEIGHT: 152.2 LBS | BODY MASS INDEX: 23.84 KG/M2

## 2022-02-10 DIAGNOSIS — Z34.80 SUPERVISION OF OTHER NORMAL PREGNANCY, ANTEPARTUM: Primary | ICD-10-CM

## 2022-02-10 LAB
GLUCOSE UR STRIP-MCNC: NEGATIVE MG/DL
PROT UR STRIP-MCNC: ABNORMAL MG/DL

## 2022-02-10 PROCEDURE — 0502F SUBSEQUENT PRENATAL CARE: CPT | Performed by: OBSTETRICS & GYNECOLOGY

## 2022-02-13 NOTE — PROGRESS NOTES
OB FOLLOW UP  CC- Here for care of pregnancy        Niesha Ro is a 36 y.o.  34w4d patient being seen today for her obstetrical follow up visit. Patient reports no complaints.     Her prenatal care is complicated by (and status) :    Patient Active Problem List   Diagnosis   • Supervision of other normal pregnancy, antepartum   • AMA (advanced maternal age) multigravida 35+   • Dizziness   • Pruritus       Flu Status: Already given in current flu season  Covid Status:    ROS -   Patient Reports : No Problems  Patient Denies: Loss of Fluid, Vaginal Spotting, Vision Changes, Headaches, Nausea , Vomiting , Contractions and Epigastric pain  Fetal Movement : normal  All other systems reviewed and are negative.       The additional following portions of the patient's history were reviewed and updated as appropriate: allergies, current medications, past family history, past medical history, past social history, past surgical history and problem list.    I have reviewed and agree with the HPI, ROS, and historical information as entered above. Brit Elias, DO    /66   Wt 69 kg (152 lb 3.2 oz)   LMP 2021   BMI 23.84 kg/m²       EXAM:     FHT: 157 BPM   Uterine Size: size equals dates  Pelvic Exam: No    Urine glucose/protein: See prenatal flowsheet       Assessment and Plan  Diagnoses and all orders for this visit:    1. Supervision of other normal pregnancy, antepartum (Primary)  Overview:  Initial visit:  • PNL: wnl  • PAP/GC/CT/Urine culture: NILM 2018 in [place].  Patient to provide records. GCCT neg; NGTD  • Blood type:A+/neg    Genetic testing:    • Declined genetic testing, discussed increased risk of aneuploidy with advanced maternal age.    Vaccinations:  • COVID: History of Covid, declines vaccination at this time  • Influenza:   • Tdap:    24-28 weeks:  • 1hr GCT:  82  • Hct/Plt:  33.3/313  • Tdap Rx  • Rhogam: Rh + not indicated    Third Trimester:  • GBS  • Breast  pump:    Ultrasound:  • Dating: U/S at 14w5d, >7 days difference. Dating by 14 week U/S  • Anatomy:  2021 EFW 34th percentile; normal anatomy, XY, anterior placenta, cephalic three-vessel cord CRYSTAL within normal limits.  Cervical length 47 mm no funneling  • Follow up: 2/10/22: EFW: 2415 g (5lb 5oz) 43rd% CRYSTAL: 18.35  vtx ant placenta grade 1 male    PLAN:  2021 routine prenatal care at this time.  Consider  testing at 36 weeks for advanced maternal age          Orders:  -     POC Urinalysis Dipstick       1. Pregnancy at 34w4d  2. Fetal status reassuring.   3. Activity and Exercise discussed.  Return in about 2 weeks (around 2022).    Brit Elias DO  02/10/2022

## 2022-02-22 ENCOUNTER — ROUTINE PRENATAL (OUTPATIENT)
Dept: OBSTETRICS AND GYNECOLOGY | Facility: CLINIC | Age: 37
End: 2022-02-22

## 2022-02-22 VITALS — WEIGHT: 155.6 LBS | SYSTOLIC BLOOD PRESSURE: 118 MMHG | BODY MASS INDEX: 24.37 KG/M2 | DIASTOLIC BLOOD PRESSURE: 72 MMHG

## 2022-02-22 DIAGNOSIS — Z34.80 SUPERVISION OF OTHER NORMAL PREGNANCY, ANTEPARTUM: Primary | ICD-10-CM

## 2022-02-22 DIAGNOSIS — O09.523 MULTIGRAVIDA OF ADVANCED MATERNAL AGE IN THIRD TRIMESTER: ICD-10-CM

## 2022-02-22 DIAGNOSIS — R42 DIZZINESS: ICD-10-CM

## 2022-02-22 LAB
GLUCOSE UR STRIP-MCNC: NEGATIVE MG/DL
PROT UR STRIP-MCNC: NEGATIVE MG/DL

## 2022-02-22 PROCEDURE — 0502F SUBSEQUENT PRENATAL CARE: CPT | Performed by: OBSTETRICS & GYNECOLOGY

## 2022-02-22 NOTE — PROGRESS NOTES
OB FOLLOW UP    CC: Scheduled OB routine FU     Prenatal care complicated by:   Patient Active Problem List   Diagnosis   • Supervision of other normal pregnancy, antepartum   • AMA (advanced maternal age) multigravida 35+       Subjective:   Patient has: No complaints, No leaking fluid, No vaginal bleeding, No contractions, Adequate FM    Objective:  Urine glucose/protein- see flow sheet      /72   Wt 70.6 kg (155 lb 9.6 oz)   LMP 06/16/2021   BMI 24.37 kg/m²   See OB flow for LE edema, and cvx exam if applicable  FHT: 142 BPM   Uterine Size: 35    Assessment and Plan:  Diagnoses and all orders for this visit:    1. Supervision of other normal pregnancy, antepartum (Primary)  Overview:  Initial visit:  • PNL: wnl  • PAP/GC/CT/Urine culture: NILM 1/2018 in [place].  Patient to provide records. GCCT neg; NGTD  • Blood type:A+/neg    Genetic testing:    • Declined genetic testing, discussed increased risk of aneuploidy with advanced maternal age.    Vaccinations:  • COVID: History of Covid, declines vaccination at this time  • Influenza:   • Tdap:    24-28 weeks:  • 1hr GCT:  82  • Hct/Plt:  33.3/313  • Tdap Rx  • Rhogam: Rh + not indicated    Ultrasound:  • Dating: U/S at 14w5d, >7 days difference. Dating by 14 week U/S  • Anatomy:  11/11/2021 EFW 34th percentile; normal anatomy, XY, anterior placenta, cephalic three-vessel cord CRYSTAL within normal limits.  Cervical length 47 mm no funneling  • Follow up: 2/10/22: EFW: 2415 g (5lb 5oz) 43rd% CRYSTAL: 18.35  vtx ant placenta grade 1 male    Assessment & Plan:  Continue prenatal vitamins  Fetal kick counts  Labor instructions  GBS next office visit  Start NSTs    Orders:  -     POC Urinalysis Dipstick    2. Dizziness  Overview:  11/11/2021 occasional episodes of dizziness, not related to positional changes.  Will come out of the blue.  Resolved without intervention.  Continue to monitor.      3. Multigravida of advanced maternal age in third trimester  Assessment &  Plan:  Start  testing weekly for AMA.  NST order placed we'll schedule for prior to leaving the office    Orders:  -     Fetal Nonstress Test; Standing      35w6d  Reassuring pregnancy progress    Counseling: OB precautions, leaking, VB, narinder newton vs PTL/Labor  [facility]    Questions answered    Return in about 1 week (around 3/1/2022) for Recheck.      Ronak Domínguez MD  2022

## 2022-02-23 PROBLEM — R42 DIZZINESS: Status: RESOLVED | Noted: 2021-11-11 | Resolved: 2022-02-23

## 2022-02-23 PROBLEM — L29.9 PRURITUS: Status: RESOLVED | Noted: 2022-01-03 | Resolved: 2022-02-23

## 2022-02-23 NOTE — ASSESSMENT & PLAN NOTE
Continue prenatal vitamins  Fetal kick counts  Labor instructions  GBS next office visit  Start NSTs

## 2022-02-28 ENCOUNTER — HOSPITAL ENCOUNTER (OUTPATIENT)
Facility: HOSPITAL | Age: 37
Discharge: HOME | End: 2022-02-28
Attending: STUDENT IN AN ORGANIZED HEALTH CARE EDUCATION/TRAINING PROGRAM | Admitting: STUDENT IN AN ORGANIZED HEALTH CARE EDUCATION/TRAINING PROGRAM

## 2022-02-28 VITALS — SYSTOLIC BLOOD PRESSURE: 101 MMHG | DIASTOLIC BLOOD PRESSURE: 68 MMHG | TEMPERATURE: 98.2 F | HEART RATE: 87 BPM

## 2022-02-28 PROCEDURE — G0463 HOSPITAL OUTPT CLINIC VISIT: HCPCS

## 2022-02-28 PROCEDURE — 59025 FETAL NON-STRESS TEST: CPT

## 2022-02-28 PROCEDURE — 59025 FETAL NON-STRESS TEST: CPT | Performed by: STUDENT IN AN ORGANIZED HEALTH CARE EDUCATION/TRAINING PROGRAM

## 2022-03-03 ENCOUNTER — ROUTINE PRENATAL (OUTPATIENT)
Dept: OBSTETRICS AND GYNECOLOGY | Facility: CLINIC | Age: 37
End: 2022-03-03

## 2022-03-03 VITALS — SYSTOLIC BLOOD PRESSURE: 114 MMHG | BODY MASS INDEX: 24.59 KG/M2 | DIASTOLIC BLOOD PRESSURE: 76 MMHG | WEIGHT: 157 LBS

## 2022-03-03 DIAGNOSIS — O09.529 ANTEPARTUM MULTIGRAVIDA OF ADVANCED MATERNAL AGE: ICD-10-CM

## 2022-03-03 DIAGNOSIS — Z34.80 SUPERVISION OF OTHER NORMAL PREGNANCY, ANTEPARTUM: Primary | ICD-10-CM

## 2022-03-03 LAB
GLUCOSE UR STRIP-MCNC: NEGATIVE MG/DL
PROT UR STRIP-MCNC: NEGATIVE MG/DL

## 2022-03-03 PROCEDURE — 0502F SUBSEQUENT PRENATAL CARE: CPT | Performed by: OBSTETRICS & GYNECOLOGY

## 2022-03-03 PROCEDURE — 87081 CULTURE SCREEN ONLY: CPT | Performed by: OBSTETRICS & GYNECOLOGY

## 2022-03-03 NOTE — PROGRESS NOTES
Chief Complaint:  Scheduled OB visit    HPI: 36 y.o.  at 37w1d   Positive baby movement    Vitals:    22 1241   BP: 114/76   Weight: 71.2 kg (157 lb)       See OB flowsheet also for pregnancy related data.    A/P  Intrauterine pregnancy at 37w1d   Diagnoses and all orders for this visit:    1. Supervision of other normal pregnancy, antepartum (Primary)  Overview:  Initial visit:  • PNL: wnl  • PAP/GC/CT/Urine culture: NILM 2018 in [place].  Patient to provide records. GCCT neg; NGTD  • Blood type:A+/neg    Genetic testing:    • Declined genetic testing, discussed increased risk of aneuploidy with advanced maternal age.    Vaccinations:  • COVID: History of Covid, declines vaccination at this time  • Influenza:   • Tdap:    24-28 weeks:  • 1hr GCT:  82  • Hct/Plt:  33.3/313  • Tdap Rx  • Rhogam: Rh + not indicated    Third Trimester:  • GBS  • Breast pump:    Ultrasound:  • Dating: U/S at 14w5d, >7 days difference. Dating by 14 week U/S  • Anatomy:  2021 EFW 34th percentile; normal anatomy, XY, anterior placenta, cephalic three-vessel cord CRYSTAL within normal limits.  Cervical length 47 mm no funneling  • Follow up: 2/10/22: EFW: 2415 g (5lb 5oz) 43rd% CRYSTAL: 18.35  vtx ant placenta grade 1 male    PLAN:  2021 routine prenatal care at this time.  Consider  testing at 36 weeks for advanced maternal age          Orders:  -     POC Urinalysis Dipstick  -     Group B Streptococcus Culture - Swab, Vaginal/Rectum    2. Antepartum multigravida of advanced maternal age  Encourage induction in the 39th week secondary to AMA.    Continue prenatal vitamins.  Encouraged fetal kick counts, 10 movements in 2 hours every day.  To labor and delivery if lack fetal movement    PLAN:   Return in about 1 week (around 3/10/2022).    Nasir Alcocer Sr., MD  3/3/2022 13:30 EST

## 2022-03-05 LAB — BACTERIA SPEC AEROBE CULT: NORMAL

## 2022-03-07 ENCOUNTER — HOSPITAL ENCOUNTER (OUTPATIENT)
Dept: LABOR AND DELIVERY | Facility: HOSPITAL | Age: 37
Discharge: HOME | End: 2022-03-07

## 2022-03-07 ENCOUNTER — HOSPITAL ENCOUNTER (OUTPATIENT)
Facility: HOSPITAL | Age: 37
Discharge: HOME | End: 2022-03-07
Attending: OBSTETRICS & GYNECOLOGY | Admitting: OBSTETRICS & GYNECOLOGY

## 2022-03-07 VITALS — DIASTOLIC BLOOD PRESSURE: 65 MMHG | HEART RATE: 119 BPM | SYSTOLIC BLOOD PRESSURE: 102 MMHG | RESPIRATION RATE: 16 BRPM

## 2022-03-07 PROCEDURE — 59025 FETAL NON-STRESS TEST: CPT | Performed by: OBSTETRICS & GYNECOLOGY

## 2022-03-07 PROCEDURE — G0463 HOSPITAL OUTPT CLINIC VISIT: HCPCS

## 2022-03-07 PROCEDURE — 59025 FETAL NON-STRESS TEST: CPT

## 2022-03-07 NOTE — NON STRESS TEST
Obstetrical Non-stress Test Interpretation     Name:  Niesha Ro  MRN: [id number]    36 y.o. female  at 37w5d    Indication: AMA    Fetal Movement: active  Fetal HR Assessment Method: external  Fetal HR (beats/min): 145  Fetal HR Baseline: normal range  Fetal HR Variability: moderate (amplitude range 6 to 25 bpm)  Fetal HR Accelerations: episodic, greater than/equal to 15 bpm, lasting at least 15 seconds  Fetal HR Decelerations: absent  Sinusoidal Pattern Present: absent  Fetal HR Tracing Category: Category I    /65 (BP Location: Right arm, Patient Position: Lying)   Pulse 119   Resp 16   LMP 2021     Reason for test: Other (Comment) (AMA)  Date of Test: 3/7/2022  Time frame of test: 6580-5028  RN NST Interpretation: Reactive      Marlene Jamil RN  3/7/2022  09:41 EST

## 2022-03-07 NOTE — PROGRESS NOTES
Obstetrical Non-stress Test Interpretation     Name:  Niesha Ro  MRN: [id number]    36 y.o. female  at 37w5d    Indication: AMA    Weeks Gestation: 37w5d     Baseline: 150 BPM  Variability:   Moderate/Normal (amplitude 6-25 bpm)  Accelerations: Present (32 weeks+) 15 x 15 bpm  Decelerations: Absent   Contractions:  Absent    Impression:  Reactive NST  AMA    Plan: Discharge to home.  Keep follow up per office instructions.      Nasir Alcocer Sr., MD  3/7/2022  13:43 EST

## 2022-03-08 ENCOUNTER — ROUTINE PRENATAL (OUTPATIENT)
Dept: OBSTETRICS AND GYNECOLOGY | Facility: CLINIC | Age: 37
End: 2022-03-08

## 2022-03-08 ENCOUNTER — PREP FOR SURGERY (OUTPATIENT)
Dept: OTHER | Facility: HOSPITAL | Age: 37
End: 2022-03-08

## 2022-03-08 ENCOUNTER — HOSPITAL ENCOUNTER (INPATIENT)
Facility: HOSPITAL | Age: 37
LOS: 2 days | Discharge: HOME | End: 2022-03-10
Attending: OBSTETRICS & GYNECOLOGY | Admitting: STUDENT IN AN ORGANIZED HEALTH CARE EDUCATION/TRAINING PROGRAM

## 2022-03-08 VITALS — SYSTOLIC BLOOD PRESSURE: 105 MMHG | BODY MASS INDEX: 24.59 KG/M2 | DIASTOLIC BLOOD PRESSURE: 75 MMHG | WEIGHT: 157 LBS

## 2022-03-08 DIAGNOSIS — Z34.80 SUPERVISION OF OTHER NORMAL PREGNANCY, ANTEPARTUM: Primary | ICD-10-CM

## 2022-03-08 DIAGNOSIS — O09.523 MULTIGRAVIDA OF ADVANCED MATERNAL AGE IN THIRD TRIMESTER: ICD-10-CM

## 2022-03-08 DIAGNOSIS — O09.523 MATERNAL AGE > 35, MULTIGRAVIDA, THIRD TRIMESTER: Primary | ICD-10-CM

## 2022-03-08 PROBLEM — Z37.9 NORMAL LABOR: Status: ACTIVE | Noted: 2022-03-08

## 2022-03-08 LAB
A1 MICROGLOB PLACENTAL VAG QL: POSITIVE
BASOPHILS # BLD AUTO: 0.06 10*3/MM3 (ref 0–0.2)
BASOPHILS NFR BLD AUTO: 0.5 % (ref 0–1.5)
DEPRECATED RDW RBC AUTO: 44 FL (ref 37–54)
EOSINOPHIL # BLD AUTO: 0.13 10*3/MM3 (ref 0–0.4)
EOSINOPHIL NFR BLD AUTO: 1.1 % (ref 0.3–6.2)
ERYTHROCYTE [DISTWIDTH] IN BLOOD BY AUTOMATED COUNT: 13.9 % (ref 12.3–15.4)
GLUCOSE UR STRIP-MCNC: NEGATIVE MG/DL
HCT VFR BLD AUTO: 34.2 % (ref 34–46.6)
HGB BLD-MCNC: 11.8 G/DL (ref 12–15.9)
IMM GRANULOCYTES # BLD AUTO: 0.04 10*3/MM3 (ref 0–0.05)
IMM GRANULOCYTES NFR BLD AUTO: 0.3 % (ref 0–0.5)
LYMPHOCYTES # BLD AUTO: 2.5 10*3/MM3 (ref 0.7–3.1)
LYMPHOCYTES NFR BLD AUTO: 21 % (ref 19.6–45.3)
MCH RBC QN AUTO: 30.1 PG (ref 26.6–33)
MCHC RBC AUTO-ENTMCNC: 34.5 G/DL (ref 31.5–35.7)
MCV RBC AUTO: 87.2 FL (ref 79–97)
MONOCYTES # BLD AUTO: 0.96 10*3/MM3 (ref 0.1–0.9)
MONOCYTES NFR BLD AUTO: 8.1 % (ref 5–12)
NEUTROPHILS NFR BLD AUTO: 69 % (ref 42.7–76)
NEUTROPHILS NFR BLD AUTO: 8.2 10*3/MM3 (ref 1.7–7)
NRBC BLD AUTO-RTO: 0 /100 WBC (ref 0–0.2)
PLATELET # BLD AUTO: 338 10*3/MM3 (ref 140–450)
PMV BLD AUTO: 9.5 FL (ref 6–12)
PROT UR STRIP-MCNC: NEGATIVE MG/DL
RBC # BLD AUTO: 3.92 10*6/MM3 (ref 3.77–5.28)
WBC NRBC COR # BLD: 11.89 10*3/MM3 (ref 3.4–10.8)

## 2022-03-08 PROCEDURE — 80307 DRUG TEST PRSMV CHEM ANLYZR: CPT | Performed by: STUDENT IN AN ORGANIZED HEALTH CARE EDUCATION/TRAINING PROGRAM

## 2022-03-08 PROCEDURE — 86901 BLOOD TYPING SEROLOGIC RH(D): CPT | Performed by: STUDENT IN AN ORGANIZED HEALTH CARE EDUCATION/TRAINING PROGRAM

## 2022-03-08 PROCEDURE — 86850 RBC ANTIBODY SCREEN: CPT | Performed by: STUDENT IN AN ORGANIZED HEALTH CARE EDUCATION/TRAINING PROGRAM

## 2022-03-08 PROCEDURE — 84112 EVAL AMNIOTIC FLUID PROTEIN: CPT | Performed by: STUDENT IN AN ORGANIZED HEALTH CARE EDUCATION/TRAINING PROGRAM

## 2022-03-08 PROCEDURE — 0502F SUBSEQUENT PRENATAL CARE: CPT | Performed by: OBSTETRICS & GYNECOLOGY

## 2022-03-08 PROCEDURE — 86900 BLOOD TYPING SEROLOGIC ABO: CPT | Performed by: STUDENT IN AN ORGANIZED HEALTH CARE EDUCATION/TRAINING PROGRAM

## 2022-03-08 PROCEDURE — 85025 COMPLETE CBC W/AUTO DIFF WBC: CPT | Performed by: STUDENT IN AN ORGANIZED HEALTH CARE EDUCATION/TRAINING PROGRAM

## 2022-03-08 PROCEDURE — S0260 H&P FOR SURGERY: HCPCS | Performed by: STUDENT IN AN ORGANIZED HEALTH CARE EDUCATION/TRAINING PROGRAM

## 2022-03-08 RX ORDER — TERBUTALINE SULFATE 1 MG/ML
0.25 INJECTION, SOLUTION SUBCUTANEOUS AS NEEDED
Status: DISCONTINUED | OUTPATIENT
Start: 2022-03-08 | End: 2022-03-09 | Stop reason: HOSPADM

## 2022-03-08 RX ORDER — MORPHINE SULFATE 2 MG/ML
2 INJECTION, SOLUTION INTRAMUSCULAR; INTRAVENOUS
Status: DISCONTINUED | OUTPATIENT
Start: 2022-03-08 | End: 2022-03-09 | Stop reason: HOSPADM

## 2022-03-08 RX ORDER — ONDANSETRON 2 MG/ML
4 INJECTION INTRAMUSCULAR; INTRAVENOUS EVERY 6 HOURS PRN
Status: DISCONTINUED | OUTPATIENT
Start: 2022-03-08 | End: 2022-03-09 | Stop reason: HOSPADM

## 2022-03-08 RX ORDER — ONDANSETRON 4 MG/1
4 TABLET, FILM COATED ORAL EVERY 6 HOURS PRN
Status: DISCONTINUED | OUTPATIENT
Start: 2022-03-08 | End: 2022-03-09 | Stop reason: HOSPADM

## 2022-03-08 RX ORDER — SODIUM CHLORIDE 0.9 % (FLUSH) 0.9 %
10 SYRINGE (ML) INJECTION AS NEEDED
Status: DISCONTINUED | OUTPATIENT
Start: 2022-03-08 | End: 2022-03-09 | Stop reason: HOSPADM

## 2022-03-08 RX ORDER — SODIUM CHLORIDE 0.9 % (FLUSH) 0.9 %
3 SYRINGE (ML) INJECTION EVERY 12 HOURS SCHEDULED
Status: DISCONTINUED | OUTPATIENT
Start: 2022-03-09 | End: 2022-03-09 | Stop reason: HOSPADM

## 2022-03-08 RX ORDER — HYDROXYZINE HYDROCHLORIDE 25 MG/1
50 TABLET, FILM COATED ORAL NIGHTLY PRN
Status: DISCONTINUED | OUTPATIENT
Start: 2022-03-08 | End: 2022-03-09 | Stop reason: HOSPADM

## 2022-03-08 RX ORDER — ACETAMINOPHEN 325 MG/1
625 TABLET ORAL EVERY 4 HOURS PRN
Status: DISCONTINUED | OUTPATIENT
Start: 2022-03-08 | End: 2022-03-09 | Stop reason: HOSPADM

## 2022-03-08 RX ORDER — OXYTOCIN-SODIUM CHLORIDE 0.9% IV SOLN 30 UNIT/500ML 30-0.9/5 UT/ML-%
2-20 SOLUTION INTRAVENOUS
Status: DISCONTINUED | OUTPATIENT
Start: 2022-03-09 | End: 2022-03-09 | Stop reason: HOSPADM

## 2022-03-08 RX ORDER — SODIUM CHLORIDE, SODIUM LACTATE, POTASSIUM CHLORIDE, CALCIUM CHLORIDE 600; 310; 30; 20 MG/100ML; MG/100ML; MG/100ML; MG/100ML
125 INJECTION, SOLUTION INTRAVENOUS CONTINUOUS
Status: DISCONTINUED | OUTPATIENT
Start: 2022-03-09 | End: 2022-03-10 | Stop reason: HOSPADM

## 2022-03-08 RX ORDER — METHYLERGONOVINE MALEATE 0.2 MG/ML
200 INJECTION INTRAVENOUS ONCE AS NEEDED
Status: DISCONTINUED | OUTPATIENT
Start: 2022-03-08 | End: 2022-03-09 | Stop reason: HOSPADM

## 2022-03-08 RX ORDER — LIDOCAINE HYDROCHLORIDE 10 MG/ML
5 INJECTION, SOLUTION EPIDURAL; INFILTRATION; INTRACAUDAL; PERINEURAL AS NEEDED
Status: DISCONTINUED | OUTPATIENT
Start: 2022-03-08 | End: 2022-03-09 | Stop reason: HOSPADM

## 2022-03-08 RX ORDER — MISOPROSTOL 200 UG/1
800 TABLET ORAL AS NEEDED
Status: DISCONTINUED | OUTPATIENT
Start: 2022-03-08 | End: 2022-03-09 | Stop reason: HOSPADM

## 2022-03-08 RX ORDER — TRISODIUM CITRATE DIHYDRATE AND CITRIC ACID MONOHYDRATE 500; 334 MG/5ML; MG/5ML
30 SOLUTION ORAL ONCE AS NEEDED
Status: DISCONTINUED | OUTPATIENT
Start: 2022-03-08 | End: 2022-03-09 | Stop reason: HOSPADM

## 2022-03-08 RX ORDER — CARBOPROST TROMETHAMINE 250 UG/ML
250 INJECTION, SOLUTION INTRAMUSCULAR AS NEEDED
Status: DISCONTINUED | OUTPATIENT
Start: 2022-03-08 | End: 2022-03-09 | Stop reason: HOSPADM

## 2022-03-08 NOTE — PROGRESS NOTES
OB FOLLOW UP    CC: Scheduled OB routine FU     Prenatal care complicated by:   Patient Active Problem List   Diagnosis   • Supervision of other normal pregnancy, antepartum   • AMA (advanced maternal age) multigravida 35+       Subjective:   Patient has: No complaints, No leaking fluid, No vaginal bleeding, No contractions, Adequate FM    Objective:  Urine glucose/protein- see flow sheet      /75   Wt 71.2 kg (157 lb)   LMP 06/16/2021   BMI 24.59 kg/m²   See OB flow for LE edema, and cvx exam if applicable  FHT: 150 BPM   Uterine Size: 38 cm      Assessment and Plan:  Diagnoses and all orders for this visit:    1. Supervision of other normal pregnancy, antepartum (Primary)  Overview:      Genetic testing:    • Declined genetic testing, discussed increased risk of aneuploidy with advanced maternal age.    Vaccinations:  • COVID: History of Covid, declines vaccination at this time  • Influenza: Recommended  • Tdap: Recommended    Ultrasound:  • Dating: U/S at 14w5d, >7 days difference. Dating by 14 week U/S  • Anatomy:  11/11/2021 EFW 34th percentile; normal anatomy, XY, anterior placenta, cephalic three-vessel cord CRYSTAL within normal limits.  Cervical length 47 mm no funneling  • Follow up: 2/10/22: EFW: 2415 g (5lb 5oz) 43rd% CRYSTAL: 18.35  vtx ant placenta grade 1 male    Assessment & Plan:  GBS results negative and reviewed with patient.  Continue prenatal vitamins  Fetal kick counts  Labor instructions    Orders:  -     POC Urinalysis Dipstick    2. Multigravida of advanced maternal age in third trimester  Overview:  NSTs: Done  Induction of labor: 3/24/2022    Assessment & Plan:  Continue NSTs.  Induction labor scheduled for 3/24/2022  The patient has declined induction of labor prior to this gestational age.          37w6d  Reassuring pregnancy progress    Counseling: OB precautions, leaking, VB, narinder newton vs PTL/Labor  [facility]    Questions answered    Return in about 1 week (around 3/15/2022) for  Artemio.      Ronak Domínguez MD  03/08/2022

## 2022-03-08 NOTE — ASSESSMENT & PLAN NOTE
GBS results negative and reviewed with patient.  Continue prenatal vitamins  Fetal kick counts  Labor instructions

## 2022-03-09 ENCOUNTER — ANESTHESIA EVENT (OUTPATIENT)
Dept: LABOR AND DELIVERY | Facility: HOSPITAL | Age: 37
End: 2022-03-09

## 2022-03-09 ENCOUNTER — ANESTHESIA (OUTPATIENT)
Dept: LABOR AND DELIVERY | Facility: HOSPITAL | Age: 37
End: 2022-03-09

## 2022-03-09 PROBLEM — O09.529 AMA (ADVANCED MATERNAL AGE) MULTIGRAVIDA 35+: Status: RESOLVED | Noted: 2021-09-16 | Resolved: 2022-03-09

## 2022-03-09 PROBLEM — Z37.9 NORMAL LABOR: Status: RESOLVED | Noted: 2022-03-08 | Resolved: 2022-03-09

## 2022-03-09 LAB
ABO GROUP BLD: NORMAL
AMPHET+METHAMPHET UR QL: NEGATIVE
BARBITURATES UR QL SCN: NEGATIVE
BENZODIAZ UR QL SCN: NEGATIVE
BLD GP AB SCN SERPL QL: NEGATIVE
CANNABINOIDS SERPL QL: NEGATIVE
COCAINE UR QL: NEGATIVE
METHADONE UR QL SCN: NEGATIVE
OPIATES UR QL: NEGATIVE
OXYCODONE UR QL SCN: NEGATIVE
RH BLD: POSITIVE
SARS-COV-2 RNA PNL SPEC NAA+PROBE: NOT DETECTED
T&S EXPIRATION DATE: NORMAL

## 2022-03-09 PROCEDURE — 25010000002 FENTANYL CITRATE (PF) 50 MCG/ML SOLUTION: Performed by: ANESTHESIOLOGY

## 2022-03-09 PROCEDURE — 0HQ9XZZ REPAIR PERINEUM SKIN, EXTERNAL APPROACH: ICD-10-PCS | Performed by: STUDENT IN AN ORGANIZED HEALTH CARE EDUCATION/TRAINING PROGRAM

## 2022-03-09 PROCEDURE — 59400 OBSTETRICAL CARE: CPT | Performed by: STUDENT IN AN ORGANIZED HEALTH CARE EDUCATION/TRAINING PROGRAM

## 2022-03-09 PROCEDURE — 51702 INSERT TEMP BLADDER CATH: CPT

## 2022-03-09 PROCEDURE — U0004 COV-19 TEST NON-CDC HGH THRU: HCPCS | Performed by: STUDENT IN AN ORGANIZED HEALTH CARE EDUCATION/TRAINING PROGRAM

## 2022-03-09 PROCEDURE — 25010000002 ROPIVACAINE PER 1 MG: Performed by: ANESTHESIOLOGY

## 2022-03-09 PROCEDURE — C1755 CATHETER, INTRASPINAL: HCPCS | Performed by: ANESTHESIOLOGY

## 2022-03-09 PROCEDURE — 59025 FETAL NON-STRESS TEST: CPT

## 2022-03-09 RX ORDER — MISOPROSTOL 200 UG/1
800 TABLET ORAL ONCE AS NEEDED
Status: DISCONTINUED | OUTPATIENT
Start: 2022-03-09 | End: 2022-03-10 | Stop reason: HOSPADM

## 2022-03-09 RX ORDER — OXYCODONE HYDROCHLORIDE 5 MG/1
5 TABLET ORAL EVERY 4 HOURS PRN
Status: DISCONTINUED | OUTPATIENT
Start: 2022-03-09 | End: 2022-03-10 | Stop reason: HOSPADM

## 2022-03-09 RX ORDER — SODIUM CHLORIDE 0.9 % (FLUSH) 0.9 %
1-10 SYRINGE (ML) INJECTION AS NEEDED
Status: DISCONTINUED | OUTPATIENT
Start: 2022-03-09 | End: 2022-03-10 | Stop reason: HOSPADM

## 2022-03-09 RX ORDER — DOCUSATE SODIUM 100 MG/1
100 CAPSULE, LIQUID FILLED ORAL DAILY
Status: DISCONTINUED | OUTPATIENT
Start: 2022-03-09 | End: 2022-03-10 | Stop reason: HOSPADM

## 2022-03-09 RX ORDER — EPHEDRINE SULFATE 50 MG/ML
5 INJECTION, SOLUTION INTRAVENOUS
Status: DISCONTINUED | OUTPATIENT
Start: 2022-03-09 | End: 2022-03-09 | Stop reason: HOSPADM

## 2022-03-09 RX ORDER — CARBOPROST TROMETHAMINE 250 UG/ML
250 INJECTION, SOLUTION INTRAMUSCULAR
Status: DISCONTINUED | OUTPATIENT
Start: 2022-03-09 | End: 2022-03-10 | Stop reason: HOSPADM

## 2022-03-09 RX ORDER — ONDANSETRON 4 MG/1
4 TABLET, FILM COATED ORAL EVERY 6 HOURS PRN
Status: DISCONTINUED | OUTPATIENT
Start: 2022-03-09 | End: 2022-03-09 | Stop reason: HOSPADM

## 2022-03-09 RX ORDER — ONDANSETRON 2 MG/ML
4 INJECTION INTRAMUSCULAR; INTRAVENOUS EVERY 6 HOURS PRN
Status: DISCONTINUED | OUTPATIENT
Start: 2022-03-09 | End: 2022-03-09 | Stop reason: HOSPADM

## 2022-03-09 RX ORDER — FENTANYL CITRATE 50 UG/ML
INJECTION, SOLUTION INTRAMUSCULAR; INTRAVENOUS
Status: COMPLETED
Start: 2022-03-09 | End: 2022-03-09

## 2022-03-09 RX ORDER — FENTANYL CITRATE 50 UG/ML
INJECTION, SOLUTION INTRAMUSCULAR; INTRAVENOUS
Status: COMPLETED | OUTPATIENT
Start: 2022-03-09 | End: 2022-03-09

## 2022-03-09 RX ORDER — CALCIUM CARBONATE 200(500)MG
2 TABLET,CHEWABLE ORAL 3 TIMES DAILY PRN
Status: DISCONTINUED | OUTPATIENT
Start: 2022-03-09 | End: 2022-03-10 | Stop reason: HOSPADM

## 2022-03-09 RX ORDER — HYDROCODONE BITARTRATE AND ACETAMINOPHEN 5; 325 MG/1; MG/1
1 TABLET ORAL EVERY 4 HOURS PRN
Status: DISCONTINUED | OUTPATIENT
Start: 2022-03-09 | End: 2022-03-09 | Stop reason: HOSPADM

## 2022-03-09 RX ORDER — ROPIVACAINE HYDROCHLORIDE 2 MG/ML
INJECTION, SOLUTION EPIDURAL; INFILTRATION; PERINEURAL
Status: COMPLETED
Start: 2022-03-09 | End: 2022-03-09

## 2022-03-09 RX ORDER — BISACODYL 10 MG
10 SUPPOSITORY, RECTAL RECTAL DAILY PRN
Status: DISCONTINUED | OUTPATIENT
Start: 2022-03-10 | End: 2022-03-10 | Stop reason: HOSPADM

## 2022-03-09 RX ORDER — IBUPROFEN 800 MG/1
800 TABLET ORAL EVERY 8 HOURS SCHEDULED
Status: DISCONTINUED | OUTPATIENT
Start: 2022-03-09 | End: 2022-03-10 | Stop reason: HOSPADM

## 2022-03-09 RX ORDER — SODIUM CHLORIDE, SODIUM LACTATE, POTASSIUM CHLORIDE, CALCIUM CHLORIDE 600; 310; 30; 20 MG/100ML; MG/100ML; MG/100ML; MG/100ML
125 INJECTION, SOLUTION INTRAVENOUS CONTINUOUS
Status: DISCONTINUED | OUTPATIENT
Start: 2022-03-09 | End: 2022-03-10 | Stop reason: HOSPADM

## 2022-03-09 RX ORDER — ONDANSETRON 2 MG/ML
4 INJECTION INTRAMUSCULAR; INTRAVENOUS EVERY 6 HOURS PRN
Status: DISCONTINUED | OUTPATIENT
Start: 2022-03-09 | End: 2022-03-10 | Stop reason: HOSPADM

## 2022-03-09 RX ORDER — ACETAMINOPHEN 500 MG
1000 TABLET ORAL EVERY 6 HOURS
Status: DISCONTINUED | OUTPATIENT
Start: 2022-03-09 | End: 2022-03-10 | Stop reason: HOSPADM

## 2022-03-09 RX ORDER — HYDROXYZINE HYDROCHLORIDE 25 MG/1
50 TABLET, FILM COATED ORAL NIGHTLY PRN
Status: DISCONTINUED | OUTPATIENT
Start: 2022-03-09 | End: 2022-03-09 | Stop reason: HOSPADM

## 2022-03-09 RX ORDER — OXYCODONE HYDROCHLORIDE 5 MG/1
5 TABLET ORAL EVERY 4 HOURS PRN
Status: DISCONTINUED | OUTPATIENT
Start: 2022-03-09 | End: 2022-03-09 | Stop reason: SDUPTHER

## 2022-03-09 RX ORDER — PROMETHAZINE HYDROCHLORIDE 12.5 MG/1
12.5 TABLET ORAL EVERY 4 HOURS PRN
Status: DISCONTINUED | OUTPATIENT
Start: 2022-03-09 | End: 2022-03-10 | Stop reason: HOSPADM

## 2022-03-09 RX ORDER — LIDOCAINE HYDROCHLORIDE AND EPINEPHRINE 15; 5 MG/ML; UG/ML
INJECTION, SOLUTION EPIDURAL
Status: COMPLETED | OUTPATIENT
Start: 2022-03-09 | End: 2022-03-09

## 2022-03-09 RX ORDER — ONDANSETRON 4 MG/1
4 TABLET, FILM COATED ORAL EVERY 8 HOURS PRN
Status: DISCONTINUED | OUTPATIENT
Start: 2022-03-09 | End: 2022-03-10 | Stop reason: HOSPADM

## 2022-03-09 RX ORDER — OXYTOCIN-SODIUM CHLORIDE 0.9% IV SOLN 30 UNIT/500ML 30-0.9/5 UT/ML-%
125 SOLUTION INTRAVENOUS ONCE
Status: COMPLETED | OUTPATIENT
Start: 2022-03-09 | End: 2022-03-09

## 2022-03-09 RX ORDER — ACETAMINOPHEN 325 MG/1
650 TABLET ORAL EVERY 4 HOURS PRN
Status: DISCONTINUED | OUTPATIENT
Start: 2022-03-09 | End: 2022-03-09 | Stop reason: HOSPADM

## 2022-03-09 RX ORDER — IBUPROFEN 600 MG/1
600 TABLET ORAL EVERY 6 HOURS PRN
Status: DISCONTINUED | OUTPATIENT
Start: 2022-03-09 | End: 2022-03-09 | Stop reason: HOSPADM

## 2022-03-09 RX ORDER — ROPIVACAINE HYDROCHLORIDE 2 MG/ML
INJECTION, SOLUTION EPIDURAL; INFILTRATION; PERINEURAL
Status: COMPLETED | OUTPATIENT
Start: 2022-03-09 | End: 2022-03-09

## 2022-03-09 RX ORDER — METHYLERGONOVINE MALEATE 0.2 MG/ML
200 INJECTION INTRAVENOUS ONCE AS NEEDED
Status: DISCONTINUED | OUTPATIENT
Start: 2022-03-09 | End: 2022-03-10 | Stop reason: HOSPADM

## 2022-03-09 RX ADMIN — WITCH HAZEL 1 PAD: 500 SOLUTION RECTAL; TOPICAL at 06:25

## 2022-03-09 RX ADMIN — ACETAMINOPHEN 1000 MG: 500 TABLET ORAL at 08:48

## 2022-03-09 RX ADMIN — ACETAMINOPHEN 1000 MG: 500 TABLET ORAL at 20:02

## 2022-03-09 RX ADMIN — Medication: at 06:26

## 2022-03-09 RX ADMIN — IBUPROFEN 800 MG: 800 TABLET, FILM COATED ORAL at 08:47

## 2022-03-09 RX ADMIN — LIDOCAINE HYDROCHLORIDE AND EPINEPHRINE 3 ML: 15; 5 INJECTION, SOLUTION EPIDURAL at 00:35

## 2022-03-09 RX ADMIN — ROPIVACAINE HYDROCHLORIDE 5 ML: 2 INJECTION, SOLUTION EPIDURAL; INFILTRATION; PERINEURAL at 00:35

## 2022-03-09 RX ADMIN — DOCUSATE SODIUM 100 MG: 100 CAPSULE, LIQUID FILLED ORAL at 08:47

## 2022-03-09 RX ADMIN — ACETAMINOPHEN 1000 MG: 500 TABLET ORAL at 13:53

## 2022-03-09 RX ADMIN — Medication 10 ML/HR: at 00:46

## 2022-03-09 RX ADMIN — FENTANYL CITRATE 100 MCG: 50 INJECTION, SOLUTION INTRAMUSCULAR; INTRAVENOUS at 00:35

## 2022-03-09 RX ADMIN — IBUPROFEN 800 MG: 800 TABLET, FILM COATED ORAL at 16:15

## 2022-03-09 RX ADMIN — OXYTOCIN 125 ML/HR: 10 INJECTION, SOLUTION INTRAMUSCULAR; INTRAVENOUS at 04:45

## 2022-03-09 NOTE — DISCHARGE INSTRUCTIONS
DR. SARKAR'S POSTPARTUM DISCHARGE PRECAUTIONS and Answers to FAQs    NO SEX for [SIX] weeks.    NO TUB BATH or POOL for [TWO] week(s), shower only.  Sitz baths are fine.    STITCHES (if present):  wash them daily in the shower with soap and water (any type of soap is fine, it does not need to be antibacterial soap).  It is ok to gently put your finger in and around the vaginal area.  Look at your stitches (the ones on the outside) when you get home.  You will then know what is normal and can have a point of reference to compare it to if you start to have concerns.  REDNESS, PUS, increase in PAIN, FEVER or CHILLS are all reasons to be seen our office immediately.  Go to the ER, if it is after hours or a weekend.      VAGINAL BLEEDING:  may continue on and off over the next several weeks after delivery and may increase slightly once you go home.  You should not be bleeding more than 1 large pad soaked every hour or two.  Clots (even the size of a lemon or larger) may be normal as long as the bleeding is not heavy and the clots do not continue.      FEVER or CHILLS or NOT FEELING WELL: call our office.  If the office is closed, you need to be seen in acute care or ER.      CHEST PAIN or SHORTNESS OF BREATH/AIR: you need to GO TO THE NEAREST ER or CALL 911.     SWELLING: can increase over the next 7-10 days and then should slowly improve.  Your legs/ankles should be fairly similar in size.  A red, painful, hot, swollen leg (usually just one side) can be a sign of a blood clot and should be evaluated immediately.  Call our office.  If it is after hours or a weekend, you must be seen IMMEDIATELY IN THE ER.     ELEVATED BLOOD PRESSURE:  you need to contact us if you are having  persistent elevated BP systolic (top number) more than [155] or diastolic (bottom number) more than [95], or a headache (not relieved with rest, hydration or over the counter pain reliever), an increase in your swelling (usually hands and face),  changes in your vision (typically flashing white or black spots) or severe persistent pain in the location of the upper right side of your belly (under your right breast).  Call our office or go to ER if after hours or a weekend.    LACTATION QUESTIONS or CONCERNS?  Call [facility] Lactation Support [phone].    WORK and SCHOOL TIME OFF: depends on your specific delivery type, surrounding circumstances, and your work insurance/school rules.  If you have questions, please call Esha or Adeola at [phone] (ext. [phone]).  Or email Esha at [email or web address].  They will assist in required paperwork for you and/or family members.     Any further QUESTIONS or CONCERNS, please call [facility] at [phone].
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
## 2022-03-09 NOTE — L&D DELIVERY NOTE
[facility]  Vaginal Delivery Note   Review the Delivery Report for details.       Delivery     Delivery: Vaginal, Spontaneous     YOB: 2021    Time of Birth:  Gestational Age 3:44 AM   38w0d     Anesthesia: Epidural     Delivering clinician: Alejandro Crews    Forceps?   No   Vacuum? No    Shoulder dystocia present: No        Delivery narrative:    Date: 3/9/2022   Time:  3:44 AM   GA: 38w0d      Procedure: Spontaneous vaginal delivery   Attending: Alejandro Crews MD  EBL: 50 ml  Infant: male  infant   3110 g (6 lb 13.7 oz)   APGARS: 8  @ 1 minute / 9  @ 5 minutes  Specimen: none  Complications: none  Optimal cord clamping: Yes    Delivery: The patient pushed for a spontaneous vaginal delivery without complication.  A nuchal cord was not present.  Delayed cord clamping for 60 seconds was performed. The umbilical cord was doubly clamped and cut The infant was handed off to the waiting staff.  Gases were not sent.  The placenta did follow spontaneously.  The uterus was not explored.  The perineum was examined for laceration.  A first degree perineal laceration was sustained.  Repaired with 3-0 Vicryl in usual fashion.  Mother and  doing well in labor and delivery suite upon exiting.    Perineum: 1st degree laceration      Electronically signed by Alejandro Crews MD, 22, 4:06 AM EST.       Infant    Findings: male  infant     Infant observations: Weight: 3110 g (6 lb 13.7 oz)   Length: 20  in  Observations/Comments:        Apgars: 8  @ 1 minute /    9  @ 5 minutes   Infant Name: Tono     Placenta, Cord, and Fluid    Placenta delivered  Spontaneous  at        Cord:   present.   Nuchal Cord?  no   Cord blood obtained:     Cord gases obtained:      Cord gas results: Venous:  No results found for: PHCVEN    Arterial:  No results found for: PHCART     Repair    Episiotomy: None     No    Lacerations: Yes  Laceration Information  Laceration Repaired?   Perineal: 1st   YES   Periurethral:        Labial:       Sulcus:       Vaginal:       Cervical:         Suture used for repair: 3-0 Vicryl   Estimated Blood Loss: Est. Blood Loss (mL): 50 mL (Filed from Delivery Summary) (03/09/22 1844)             Quantitative Blood Loss:                  Complications  none    Disposition  Mother to Mother Baby/Postpartum  in stable condition currently.  Baby to remains with mom  in stable condition currently.      Alejandro Crews MD  03/09/22  04:06 EST

## 2022-03-09 NOTE — H&P
KARLEY Ortiz  Obstetric History and Physical    Chief Complaint:  Labor and SROM    Subjective:  The patient is a 36 y.o.  currently at 37w6d, who presents for rupture of membranes around 2130 this evening. She reports that soon after she began having regular contractions. Denies any vaginal bleeding or decreased fetal movement. She was seen in the office earlier today, however deferred having cervix checked.  Reports feeling nauseated after dinner, no new HA, reports occasionally seeing floaters, but that it has been present since the birth of her last child. Denies any new LE swelling.     Her prenatal care is complicated by:  Patient Active Problem List   Diagnosis   • Supervision of other normal pregnancy, antepartum   • AMA (advanced maternal age) multigravida 35+   • Normal labor         The following portions of the patients history were reviewed and updated as appropriate: current medications, allergies, past medical history, past surgical history, past family history, past social history and problem list .     Prenatal Information:  Prenatal Results     POC Urine Glucose/Protein     Test Value Reference Range Date Time    Urine Glucose  Negative mg/dL Negative, 1000 mg/dL (3+) 22 08    Urine Protein  Negative mg/dL Negative 22 0852          Initial Prenatal Labs     Test Value Reference Range Date Time    Hemoglobin  11.4 g/dL 12.0 - 15.9 21 1022       13.5 g/dL 12.0 - 15.9 21 1145    Hematocrit  33.3 % 34.0 - 46.6 21 1022       41.3 % 34.0 - 46.6 21 1145    Platelets  315 10*3/mm3 140 - 450 21 1022       370 10*3/mm3 140 - 450 21 1145    Rubella IgG  1.00 index Immune >0.99 21 1145    Hepatitis B SAg  Non-Reactive  Non-Reactive 21 1145    Hepatitis C Ab  Non-Reactive  Non-Reactive 21 1145    RPR  Non-Reactive  Non-Reactive 21 1145    ABO  A   21 1145    Rh  Positive   21 1145    Antibody Screen  Negative   21  1145    HIV  Non-Reactive  Non-Reactive 09/16/21 1145    Urine Culture  No growth   09/16/21 1141    Gonorrhea  Not Detected  Not Detected  09/16/21 1141    Chlamydia  Not Detected  Not Detected  09/16/21 1141    TSH        HgB A1c               2nd and 3rd Trimester     Test Value Reference Range Date Time    Hemoglobin (repeated)  11.4 g/dL 12.0 - 15.9 12/06/21 1022    Hematocrit (repeated)  33.3 % 34.0 - 46.6 12/06/21 1022    Platelets   315 10*3/mm3 140 - 450 12/06/21 1022       370 10*3/mm3 140 - 450 09/16/21 1145    GCT  82 mg/dL 65 - 139 12/06/21 1022    Antibody Screen (repeated)        GTT Fasting        GTT 1 Hr        GTT 2 Hr        GTT 3 Hr        Group B Strep  No Group B Streptococcus isolated   03/03/22 1523          Drug Screening     Test Value Reference Range Date Time    Amphetamine Screen        Barbiturate Screen        Benzodiazepine Screen        Methadone Screen        Phencyclidine Screen        Opiates Screen        THC Screen        Cocaine Screen        Propoxyphene Screen        Buprenorphine Screen        Methamphetamine Screen        Oxycodone Screen        Tricyclic Antidepressants Screen              Other (Risk screening)     Test Value Reference Range Date Time    Varicella IgG        Parvovirus IgG        CMV IgG        Cystic Fibrosis        Hemoglobin electrophoresis        NIPT        MSAFP-4        AFP (for NTD only)              Legend    ^: Historical                      External Prenatal Results     Pregnancy Outside Results - Transcribed From Office Records - See Scanned Records For Details     Test Value Date Time    ABO  A  09/16/21 1145    Rh  Positive  09/16/21 1145    Antibody Screen  Negative  09/16/21 1145    Varicella IgG       Rubella  1.00 index 09/16/21 1145    Hgb  11.4 g/dL 12/06/21 1022       13.5 g/dL 09/16/21 1145    Hct  33.3 % 12/06/21 1022       41.3 % 09/16/21 1145    Glucose Fasting GTT       Glucose Tolerance Test 1 hour       Glucose Tolerance Test  3 hour       Gonorrhea (discrete)  Not Detected  21 1141    Chlamydia (discrete)  Not Detected  21 1141    RPR  Non-Reactive  21 1145    VDRL       Syphilis Antibody       HBsAg  Non-Reactive  21 1145    Herpes Simplex Virus PCR       Herpes Simplex VIrus Culture       HIV  Non-Reactive  21 1145    Hep C RNA Quant PCR       Hep C Antibody  Non-Reactive  21 1145    AFP       Group B Strep  No Group B Streptococcus isolated  22 1523    GBS Susceptibility to Clindamycin       GBS Susceptibility to Erythromycin       Fetal Fibronectin       Genetic Testing, Maternal Blood             Drug Screening     Test Value Date Time    Urine Drug Screen       Amphetamine Screen       Barbiturate Screen       Benzodiazepine Screen       Methadone Screen       Phencyclidine Screen       Opiates Screen       THC Screen       Cocaine Screen       Propoxyphene Screen       Buprenorphine Screen       Methamphetamine Screen       Oxycodone Screen       Tricyclic Antidepressants Screen             Legend    ^: Historical                        Past OB History:  OB History    Para Term  AB Living   5 3 3 0 1 3   SAB IAB Ectopic Molar Multiple Live Births   1 0 0 0 0 3      # Outcome Date GA Lbr Reza/2nd Weight Sex Delivery Anes PTL Lv   5 Current            4 Term 20 40w2d  3742 g (8 lb 4 oz) M Vag-Spont   LENORA   3 Term 17 39w4d  3345 g (7 lb 6 oz) F Vag-Spont   LENORA   2 Term 12/22/15 40w2d  3742 g (8 lb 4 oz) M Vag-Spont   LENORA   1 SAB  5w0d    SAB          Past Medical History:  Past Medical History:   Diagnosis Date   • PAC (premature atrial contraction)        Past Surgical History:  Past Surgical History:   Procedure Laterality Date   • EAR TUBES     • MOLE REMOVAL         Family History:  Family History   Problem Relation Age of Onset   • Hypertension Father    • Stroke Father    • Hypertension Mother    • Breast cancer Paternal Grandmother    • Lung cancer Paternal  Grandfather        Social History:   reports that she has never smoked. She has never used smokeless tobacco.   reports no history of alcohol use.   reports no history of drug use.    Medications:  ferrous sulfate and prenatal vitamin 27-0.8    Allergies:  Allergies   Allergen Reactions   • Bactrim [Sulfamethoxazole-Trimethoprim] Hives   • Hazelnut (Filbert) Allergy Skin Test Hives   • Penicillins Hives   • Shellfish-Derived Products Hives   • Sulfa Antibiotics Hives       Review of Systems:  Adequate FM, No HA, No scotomata or vision changes, No RUQ/epigastric pain, No swelling, No fever/chills, No nausea, No vomiting, No diarrhea, No constipation, No abdominal pain, No back pain, No UTI symptoms, Leaking fluid and Contractions    Objective     Vital Signs:  BP: (103-105)/(65-75) 103/65     Physical Exam:    General:  alert, well appearing, in no apparent distress  HEENT: PERRLA, extra ocular movement intact, sclera clear, anicteric and neck supple with midline trachea  Cardiovascular: normal rate, regular rhythm,  no murmurs, rubs, or gallops  Lungs: clear to auscultation, no wheezes, rales or rhonchi, symmetric air entry  Abdomen: soft, nontender, nondistended, no abnormal masses, no epigastric pain, fundus soft, nontender 38 weeks size and estimated fetal weight: 3200  Extremities: no pedal edema noted, no redness or tenderness in the calves or thighs 2+ bilaterally  Pelvic exam: Presentation: cephalic  Dilation: 2cm  Effacement: 70  Station: -2    Fetal Assessment:    FHR:   Baseline: 130  Variability: Moderate  Accelerations: Present (32 weeks+) 15 x 15 bpm  Decelerations: Absent   Category: Category 1    Contractions: q7min    Fetal Presentation: cephalic    Labs:   Lab Results (last 24 hours)     Procedure Component Value Units Date/Time    POC Urinalysis Dipstick [[id number]] Collected: 03/08/22 0852    Specimen: Urine Updated: 03/08/22 0853     Glucose, UA Negative mg/dL      Protein, POC Negative mg/dL      Rapid Assay For ROM - Amniotic Fluid, Amniotic Sac [[id number]]  (Abnormal) Collected: 22 2301    Specimen: Amniotic Fluid from Amniotic Sac Updated: 22 2320     Rupture of Membranes Positive    Narrative:      This test detects tiny amounts of amniotic fluid and is  approved for use at any gestational age. When there is   significant presence of blood on the swab, the test can   malfunction and is not recommended (possible false positive  results). In cases of only trace amounts of blood on the swab,  the test still functions properly and will not interfere with  the test results. In very rare cases when a sample is taken  12 hours or later after a rupture, a false negative result may  occur due to obstruction of the rupture by fetus or resealing  of the amniotic sac.           Hospital Problems:    Normal labor      Assessment:  36 y.o.  currently at 37w6d    Normal labor    GBS: Negative    Plan:  amniosure resulted: positive  See labor orders, plan for   Epidural upon request  Cervical re-examination in 2 hours or sooner for fetal distress or maternal discomfort with desire to push. If cervix unchanged will augment with pitocin for SROM.   Plan of care has been reviewed with patient  Risks, benefits of treatment plan have been discussed.  All questions have been answered.    Counseling:The patient was counseled on the risks, benefits and alternatives of labor augmentation Induction.  Risks reviewed, but are not limited to: bleeding, transfusion, fetal intolerance,  (possibly emergent),  and uterine rupture.  She declines expectant management or  and desires induction.  Reviewed risks w prematurity.  All her questions have been answered to her satisfaction and she desires to proceed.    The patient was counseled for the possible need of  section.  The risk, benefits, and alternatives of surgery were discussed with the patient.  The risks discussed included but were  not limited to:  • bleeding  • infection  • injury to surrounding structures including bowel and bladder  • injury to vasculature  • injury to   • thrombosis  • need for  hysterectomy  • Risk of disfiguring scar  • Need for blood transfusion  • Maternal mortality        Alejandro Crews MD  3/8/2022  23:33 EST

## 2022-03-09 NOTE — ANESTHESIA POSTPROCEDURE EVALUATION
Patient: Niesha Ro    Procedure Summary     Date: 03/09/22 Room / Location:     Anesthesia Start: 0028 Anesthesia Stop: 0344    Procedure: LABOR ANALGESIA Diagnosis:     Scheduled Providers:  Provider: Judith Burrell MD    Anesthesia Type: epidural ASA Status: 2          Anesthesia Type: epidural    Vitals  Vitals Value Taken Time   /71 03/09/22 0640   Temp 36.8 °C (98.3 °F) 03/09/22 0115   Pulse 88 03/09/22 0640   Resp 16 03/09/22 0640   SpO2 99 % 03/09/22 0426   Vitals shown include unvalidated device data.        Post Anesthesia Care and Evaluation    Patient location during evaluation: bedside  Patient participation: complete - patient participated  Level of consciousness: awake  Pain score: 0  Pain management: adequate  Airway patency: patent  Anesthetic complications: No anesthetic complications  PONV Status: none  Cardiovascular status: acceptable and stable  Respiratory status: acceptable and room air  Hydration status: acceptable  Post Neuraxial Block status: Motor and sensory function returned to baseline and No signs or symptoms of PDPH

## 2022-03-09 NOTE — ANESTHESIA PREPROCEDURE EVALUATION
Anesthesia Evaluation     Patient summary reviewed and Nursing notes reviewed   no history of anesthetic complications:  NPO Solid Status: > 8 hours  NPO Liquid Status: > 2 hours           Airway   Mallampati: II  TM distance: >3 FB  Neck ROM: full  No difficulty expected  Dental      Pulmonary - negative pulmonary ROS and normal exam    breath sounds clear to auscultation  Cardiovascular - normal exam  Exercise tolerance: good (4-7 METS)    Rhythm: regular  Rate: normal    (+) dysrhythmias PAC,       Neuro/Psych- negative ROS  GI/Hepatic/Renal/Endo    (+)  GERD well controlled,      Musculoskeletal (-) negative ROS    Abdominal    Substance History - negative use     OB/GYN    (+) Pregnant,         Other - negative ROS                       Anesthesia Plan    ASA 2     epidural       Anesthetic plan, all risks, benefits, and alternatives have been provided, discussed and informed consent has been obtained with: patient.        CODE STATUS:    Level Of Support Discussed With: Patient  Code Status (Patient has no pulse and is not breathing): CPR (Attempt to Resuscitate)  Medical Interventions (Patient has pulse or is breathing): Full

## 2022-03-09 NOTE — ANESTHESIA PROCEDURE NOTES
Labor Epidural      Patient reassessed immediately prior to procedure    Patient location during procedure: OB  Start Time: 3/9/2022 12:28 AM  Performed By  Anesthesiologist: Judith Burrell MD  Preanesthetic Checklist  Completed: patient identified, IV checked, risks and benefits discussed, surgical consent, monitors and equipment checked, pre-op evaluation and timeout performed  Prep:  Pt Position:sitting  Sterile Tech:cap, gloves, sterile barrier, mask and gown  Prep:chlorhexidine gluconate and isopropyl alcohol  Monitoring:blood pressure monitoring, continuous pulse oximetry and EKG  Epidural Block Procedure:  Approach:midline  Guidance:landmark technique and palpation technique  Location:L3-L4  Needle Type:Tuohy  Needle Gauge:17 G  Cath Depth at skin:5 cm  Paresthesia: none  Aspiration:negative  Test Dose:negative  Medication: ropivacaine (NAROPIN) 0.2 % injection, 5 mL  fentaNYL citrate (PF) (SUBLIMAZE) injection, 100 mcg  lidocaine 1.5%-EPINEPHrine 1:200,000 (XYLOCAINE W/EPI) injection, 3 mL  Med administered at 3/9/2022 12:35 AM  Number of Attempts: 1  Post Assessment:  Dressing:occlusive dressing applied and secured with tape  Pt Tolerance:patient tolerated the procedure well with no apparent complications  Complications:no

## 2022-03-09 NOTE — DISCHARGE SUMMARY
[facility]         DISCHARGE SUMMARY    Patient Name: Niesha Ro  : 1985  MRN: [id number]    Date of Admission: 3/8/2022  Date of Discharge:  3/10/2022   Primary Care Physician: Provider, No Known    Consults     No orders found from 2022 to 3/9/2022.           Procedures:  Normal spontaneous vaginal delivery    Presenting Problem:   Spontaneous rupture of membranes  Normal labor [O80, Z37.9]    Admitting Diagnosis:  Spontaneous rupture of membranes at term  Labor at term  Advanced maternal age    Discharge Diagnosis:  Normal spontaneous vaginal delivery    Delivery Summary     OB Surgeon:  Alejandro Crews MD  Anesthesia: Epidural  Delivery Type:   Perineum: OBPERINEUM: 1st degree laceration  Feeding method: Breast    Infant: male  infant;    Weight: 3110 g (6 lb 13.7 oz)     APGARS: 8  @ 1 minute / 9  @ 5 minutes   Venous Blood Gas: No results found for: PHCVEN   Arterial Blood Gas: No results found for: PHCART     Hospital Course     Hospital Course:  Niesha Ro is a 36 y.o.  37w6d who presented on 3/8/2022 for spontaneous rupture of membranes.  Patient was found to be grossly ruptured, and had laboratory evidence with AmniSure test.  Patient was dakota approximately every 2 to 3 minutes.  Expectant management was performed and patient went on to go on delivered spontaneously a live viable male .  Her postpartum course was uncomplicated she was deemed stable for discharge on postpartum day number 1.     Day of Discharge     Vital Signs:  Temp:  [97.52 °F (36.4 °C)-98.4 °F (36.9 °C)] 97.52 °F (36.4 °C)  Heart Rate:  [60-77] 68  Resp:  [16] 16  BP: ()/(62-70) 100/67    Pertinent  and/or Most Recent Results     LAB RESULTS:       Lab 22  2340   WBC 11.89*   HEMOGLOBIN 11.8*   HEMATOCRIT 34.2   PLATELETS 338   NEUTROS ABS 8.20*   IMMATURE GRANS (ABS) 0.04   LYMPHS ABS 2.50   MONOS ABS 0.96*   EOS ABS 0.13   MCV 87.2                 Lab  03/08/22  2340   ABO TYPING A   RH TYPING Positive   ANTIBODY SCREEN Negative     URINALYSIS@  Microbiology Results (last 10 days)     Procedure Component Value - Date/Time    COVID PRE-OP / PRE-PROCEDURE SCREENING ORDER (NO ISOLATION) - Swab, Nasopharynx [[id number]]  (Normal) Collected: 03/09/22 0009    Lab Status: Final result Specimen: Swab from Nasopharynx Updated: 03/09/22 0357    Narrative:      The following orders were created for panel order COVID PRE-OP / PRE-PROCEDURE SCREENING ORDER (NO ISOLATION) - Swab, Nasopharynx.  Procedure                               Abnormality         Status                     ---------                               -----------         ------                     COVID-19,APTIMA PANTHER(...[[id number]]  Normal              Final result                 Please view results for these tests on the individual orders.    COVID-19,APTIMA PANTHER(GEOVANNY),[facility], NP/OP SWAB IN UTM/VTM/SALINE TRANSPORT MEDIA,24 HR TAT - Swab, Nasopharynx [[id number]]  (Normal) Collected: 03/09/22 0009    Lab Status: Final result Specimen: Swab from Nasopharynx Updated: 03/09/22 0357     COVID19 Not Detected    Narrative:      Fact sheet for providers: https://www.fda.gov/media/323720/download     Fact sheet for patients: https://www.fda.gov/media/733604/download    Test performed by RT PCR.    Group B Streptococcus Culture - Swab, Vaginal/Rectum [[id number]]  (Normal) Collected: 03/03/22 1523    Lab Status: Final result Specimen: Swab from Vaginal/Rectum Updated: 03/05/22 0856     Group B Strep Culture No Group B Streptococcus isolated             Discharge Details        Discharge Medications      New Medications      Instructions Start Date   acetaminophen 500 MG tablet  Commonly known as: TYLENOL   1,000 mg, Oral, Every 6 Hours      ibuprofen 800 MG tablet  Commonly known as: ADVIL,MOTRIN   800 mg, Oral, Every 8 Hours Scheduled         Continue These Medications      Instructions Start Date    prenatal vitamin 27-0.8 27-0.8 MG tablet tablet   1 tablet, Oral, Daily         Stop These Medications    ferrous sulfate 325 (65 FE) MG tablet            Allergies   Allergen Reactions   • Bactrim [Sulfamethoxazole-Trimethoprim] Hives   • Hazelnut (Filbert) Allergy Skin Test Hives   • Penicillins Hives   • Shellfish-Derived Products Hives   • Sulfa Antibiotics Hives       Discharge Disposition:   Home, self-care    Discharge Condition:  Good    Diet:   Regular    Discharge Activity:    See detailed discharge instructions.     Follow Up:  With Dr. Hall in 5 weeks for provider of choice.    Electronically signed by Alejandro Crews MD, 03/09/22, 7:57 AM EST.

## 2022-03-10 VITALS
DIASTOLIC BLOOD PRESSURE: 63 MMHG | TEMPERATURE: 97.9 F | OXYGEN SATURATION: 100 % | WEIGHT: 157 LBS | SYSTOLIC BLOOD PRESSURE: 105 MMHG | HEIGHT: 67 IN | RESPIRATION RATE: 18 BRPM | BODY MASS INDEX: 24.64 KG/M2 | HEART RATE: 72 BPM

## 2022-03-10 PROCEDURE — 0503F POSTPARTUM CARE VISIT: CPT | Performed by: STUDENT IN AN ORGANIZED HEALTH CARE EDUCATION/TRAINING PROGRAM

## 2022-03-10 RX ORDER — IBUPROFEN 800 MG/1
800 TABLET ORAL EVERY 8 HOURS SCHEDULED
Qty: 21 TABLET | Refills: 0 | Status: SHIPPED | OUTPATIENT
Start: 2022-03-10 | End: 2022-03-17

## 2022-03-10 RX ORDER — ACETAMINOPHEN 500 MG
1000 TABLET ORAL EVERY 6 HOURS
Qty: 56 TABLET | Refills: 0 | Status: SHIPPED | OUTPATIENT
Start: 2022-03-10 | End: 2022-03-17

## 2022-03-10 RX ADMIN — ACETAMINOPHEN 1000 MG: 500 TABLET ORAL at 02:47

## 2022-03-10 RX ADMIN — DOCUSATE SODIUM 100 MG: 100 CAPSULE, LIQUID FILLED ORAL at 08:05

## 2022-03-10 RX ADMIN — IBUPROFEN 800 MG: 800 TABLET, FILM COATED ORAL at 00:26

## 2022-03-10 RX ADMIN — IBUPROFEN 800 MG: 800 TABLET, FILM COATED ORAL at 08:06

## 2022-03-10 NOTE — PROGRESS NOTES
"PostPartum/PostOp PROGRESS NOTE        Subjective:  Patient has no complaints  Pain controlled  Tolerating a regular diet  Passing flatus  Ambulating  Urinating spontaneously  Lochia decreasing, no bleeding concerns  Denies HA, vision change, or RUQ/epigastric pain  No lightheadedness or dizziness  Desires DC home if baby Dc'd    Objective:  Vitals: Blood pressure 100/67, pulse 68, temperature 97.52 °F (36.4 °C), temperature source Oral, resp. rate 16, height 170.2 cm (67\"), weight 71.2 kg (157 lb), last menstrual period 2021, SpO2 100 %, currently breastfeeding.          General: NAD, alert and oriented, appropriate  Psych: Normal mood, appropriate  Abdomen: Fundus firm, non tender  Extremeties: No edema    Labs:  Lab Results (last 24 hours)     ** No results found for the last 24 hours. **            Assessment:    Post-partum/postop Day:  1  Status post   AMA    Plan:   Routine postpartum/postop care  Contraception: unsure, [email or web address] provided  Breast-feeding:: breast  Advance diet, Ambulate, Remove IV, Shower, Sitz baths, PO pain meds, Importance of wound care/keep clean and dry, Breast feeding support, Discharge home, DC meds reviewed, Follow up scheduled, PP/PO precautions given  Doing well D/C home     Electronically signed by Alejandro Crews MD, 03/10/22, 8:52 AM EST.  "

## 2022-03-10 NOTE — PLAN OF CARE
Problem: Adult Inpatient Plan of Care  Goal: Plan of Care Review  Outcome: Ongoing, Progressing  Goal: Patient-Specific Goal (Individualized)  Outcome: Ongoing, Progressing  Goal: Absence of Hospital-Acquired Illness or Injury  Outcome: Ongoing, Progressing  Intervention: Identify and Manage Fall Risk  Recent Flowsheet Documentation  Taken 3/10/2022 0400 by Maxine Mulligan RN  Safety Promotion/Fall Prevention: safety round/check completed  Taken 3/10/2022 0245 by Maxine Mulligan RN  Safety Promotion/Fall Prevention: safety round/check completed  Taken 3/10/2022 0026 by Maxine Mulligan RN  Safety Promotion/Fall Prevention: safety round/check completed  Taken 3/9/2022 2003 by Maxine Mulligan RN  Safety Promotion/Fall Prevention: safety round/check completed  Intervention: Prevent Skin Injury  Recent Flowsheet Documentation  Taken 3/9/2022 2003 by Maxine Mulligan RN  Body Position: position changed independently  Intervention: Prevent and Manage VTE (Venous Thromboembolism) Risk  Recent Flowsheet Documentation  Taken 3/9/2022 2003 by Maxine Mulligan RN  Activity Management: up ad marifer  Goal: Optimal Comfort and Wellbeing  Outcome: Ongoing, Progressing  Intervention: Monitor Pain and Promote Comfort  Recent Flowsheet Documentation  Taken 3/10/2022 0245 by Maxine Mulligan RN  Pain Management Interventions: see MAR  Taken 3/10/2022 0026 by Maxine Mulligan RN  Pain Management Interventions: see MAR  Taken 3/9/2022 2003 by Maxine Mulligan RN  Pain Management Interventions:   pain management plan reviewed with patient/caregiver   see MAR  Goal: Readiness for Transition of Care  Outcome: Ongoing, Progressing     Problem: Adjustment to Role Transition (Postpartum Vaginal Delivery)  Goal: Successful Maternal Role Transition  Outcome: Ongoing, Progressing     Problem: Bleeding (Postpartum Vaginal Delivery)  Goal: Hemostasis  Outcome: Ongoing, Progressing     Problem: Infection (Postpartum Vaginal Delivery)  Goal:  Absence of Infection Signs/Symptoms  Outcome: Ongoing, Progressing     Problem: Pain (Postpartum Vaginal Delivery)  Goal: Acceptable Pain Control  Outcome: Ongoing, Progressing  Intervention: Prevent or Manage Pain  Recent Flowsheet Documentation  Taken 3/10/2022 0245 by Maxine Mulligan RN  Pain Management Interventions: see MAR  Taken 3/10/2022 0026 by Maxine Mulligan RN  Pain Management Interventions: see MAR  Taken 3/9/2022 2003 by Maxine Mulligan RN  Pain Management Interventions:   pain management plan reviewed with patient/caregiver   see MAR     Problem: Urinary Retention (Postpartum Vaginal Delivery)  Goal: Effective Urinary Elimination  Outcome: Ongoing, Progressing     Problem: Breastfeeding  Goal: Effective Breastfeeding  Outcome: Ongoing, Progressing   Goal Outcome Evaluation:

## 2022-03-10 NOTE — LACTATION NOTE
LC in to follow up with breastfeeding progress. Patient states latch is still painful and nipples are red. Hydrogel pads have been effective for her comfort. LC observed this feeding post circ and baby was latched deeply and lips were flanged but it was . Patient shows good skills with breastfeeding. Patient is planning on discharge today. LC discussed normal infant output patterns to expect and unlimited time/access to the breast but if infant is not waking by 3 hours to wake and feed using measures shown in the hospital. LC discussed checking to make sure new medications are safe to breastfeed. LC discussed alcohol use and cigarette/second hand smoke around baby and breastfeeding and discussed the impact of street drugs on infants and breastfeeding. LC used the page in the breastfeeding guide to discuss harmful effects of these. Breastfeeding/Lactation expectations and anticipatory guidance discussed for the next two weeks . LC discussed nipple care, plugged ducts, engorgement, and breast infection. LC encouraged mom to see pediatrician two days from discharge for follow up.  Mom has a breastpump for home use and LC discussed good pumping guidelines and normal expectations with pumping and storage and preparation of ebm for feedings. LC discussed breastfeeding/lactation resources after discharge and when to call the doctor. Mom showed good understanding.

## 2022-03-11 ENCOUNTER — TELEPHONE (OUTPATIENT)
Dept: OBSTETRICS AND GYNECOLOGY | Facility: CLINIC | Age: 37
End: 2022-03-11

## 2022-03-11 NOTE — TELEPHONE ENCOUNTER
Received call from Lexi Weber, [facility] Lactation Consultant requesting prescription to [facility] for All Purpose Nipple Ointment. When contacting pharmacy, [facility] rx already called in by Dr. Crews.

## 2022-03-15 ENCOUNTER — APPOINTMENT (OUTPATIENT)
Dept: GENERAL RADIOLOGY | Facility: HOSPITAL | Age: 37
End: 2022-03-15

## 2022-03-15 ENCOUNTER — HOSPITAL ENCOUNTER (EMERGENCY)
Facility: HOSPITAL | Age: 37
Discharge: HOME | End: 2022-03-15
Attending: EMERGENCY MEDICINE | Admitting: EMERGENCY MEDICINE

## 2022-03-15 ENCOUNTER — HOSPITAL ENCOUNTER (OUTPATIENT)
Facility: HOSPITAL | Age: 37
Discharge: HOME | End: 2022-03-15
Attending: OBSTETRICS & GYNECOLOGY | Admitting: OBSTETRICS & GYNECOLOGY

## 2022-03-15 ENCOUNTER — TELEPHONE (OUTPATIENT)
Dept: INTERNAL MEDICINE | Facility: CLINIC | Age: 37
End: 2022-03-15

## 2022-03-15 VITALS
DIASTOLIC BLOOD PRESSURE: 90 MMHG | WEIGHT: 147.27 LBS | HEART RATE: 56 BPM | BODY MASS INDEX: 23.11 KG/M2 | HEIGHT: 67 IN | RESPIRATION RATE: 18 BRPM | OXYGEN SATURATION: 97 % | TEMPERATURE: 98.1 F | SYSTOLIC BLOOD PRESSURE: 131 MMHG

## 2022-03-15 VITALS
DIASTOLIC BLOOD PRESSURE: 88 MMHG | RESPIRATION RATE: 18 BRPM | OXYGEN SATURATION: 99 % | SYSTOLIC BLOOD PRESSURE: 137 MMHG | HEART RATE: 66 BPM

## 2022-03-15 DIAGNOSIS — R07.89 ATYPICAL CHEST PAIN: Primary | ICD-10-CM

## 2022-03-15 DIAGNOSIS — G44.219 EPISODIC TENSION-TYPE HEADACHE, NOT INTRACTABLE: ICD-10-CM

## 2022-03-15 DIAGNOSIS — R03.0 ELEVATED BP WITHOUT DIAGNOSIS OF HYPERTENSION: ICD-10-CM

## 2022-03-15 LAB
ALBUMIN SERPL-MCNC: 4.3 G/DL (ref 3.5–5.2)
ALBUMIN/GLOB SERPL: 1.3 G/DL
ALP SERPL-CCNC: 95 U/L (ref 39–117)
ALT SERPL W P-5'-P-CCNC: 21 U/L (ref 1–33)
ANION GAP SERPL CALCULATED.3IONS-SCNC: 10.1 MMOL/L (ref 5–15)
AST SERPL-CCNC: 16 U/L (ref 1–32)
BACTERIA UR QL AUTO: ABNORMAL /HPF
BASOPHILS # BLD AUTO: 0.05 10*3/MM3 (ref 0–0.2)
BASOPHILS NFR BLD AUTO: 0.6 % (ref 0–1.5)
BILIRUB SERPL-MCNC: <0.2 MG/DL (ref 0–1.2)
BILIRUB UR QL STRIP: NEGATIVE
BUN SERPL-MCNC: 19 MG/DL (ref 6–20)
BUN/CREAT SERPL: 25.3 (ref 7–25)
CALCIUM SPEC-SCNC: 10 MG/DL (ref 8.6–10.5)
CHLORIDE SERPL-SCNC: 104 MMOL/L (ref 98–107)
CK MB SERPL-CCNC: 1.79 NG/ML
CK SERPL-CCNC: 67 U/L (ref 20–180)
CLARITY UR: CLEAR
CO2 SERPL-SCNC: 24.9 MMOL/L (ref 22–29)
COLOR UR: YELLOW
CREAT SERPL-MCNC: 0.75 MG/DL (ref 0.57–1)
DEPRECATED RDW RBC AUTO: 43.4 FL (ref 37–54)
EGFRCR SERPLBLD CKD-EPI 2021: 106.6 ML/MIN/1.73
EOSINOPHIL # BLD AUTO: 0.18 10*3/MM3 (ref 0–0.4)
EOSINOPHIL NFR BLD AUTO: 2 % (ref 0.3–6.2)
ERYTHROCYTE [DISTWIDTH] IN BLOOD BY AUTOMATED COUNT: 13.2 % (ref 12.3–15.4)
GLOBULIN UR ELPH-MCNC: 3.3 GM/DL
GLUCOSE SERPL-MCNC: 90 MG/DL (ref 65–99)
GLUCOSE UR STRIP-MCNC: NEGATIVE MG/DL
HCT VFR BLD AUTO: 39.7 % (ref 34–46.6)
HGB BLD-MCNC: 13.3 G/DL (ref 12–15.9)
HGB UR QL STRIP.AUTO: ABNORMAL
HOLD SPECIMEN: NORMAL
HOLD SPECIMEN: NORMAL
HYALINE CASTS UR QL AUTO: ABNORMAL /LPF
IMM GRANULOCYTES # BLD AUTO: 0.04 10*3/MM3 (ref 0–0.05)
IMM GRANULOCYTES NFR BLD AUTO: 0.4 % (ref 0–0.5)
KETONES UR QL STRIP: NEGATIVE
LEUKOCYTE ESTERASE UR QL STRIP.AUTO: ABNORMAL
LIPASE SERPL-CCNC: 27 U/L (ref 13–60)
LYMPHOCYTES # BLD AUTO: 2.17 10*3/MM3 (ref 0.7–3.1)
LYMPHOCYTES NFR BLD AUTO: 24.1 % (ref 19.6–45.3)
MAGNESIUM SERPL-MCNC: 2.2 MG/DL (ref 1.6–2.6)
MCH RBC QN AUTO: 30 PG (ref 26.6–33)
MCHC RBC AUTO-ENTMCNC: 33.5 G/DL (ref 31.5–35.7)
MCV RBC AUTO: 89.4 FL (ref 79–97)
MONOCYTES # BLD AUTO: 0.53 10*3/MM3 (ref 0.1–0.9)
MONOCYTES NFR BLD AUTO: 5.9 % (ref 5–12)
NEUTROPHILS NFR BLD AUTO: 6.05 10*3/MM3 (ref 1.7–7)
NEUTROPHILS NFR BLD AUTO: 67 % (ref 42.7–76)
NITRITE UR QL STRIP: NEGATIVE
NRBC BLD AUTO-RTO: 0 /100 WBC (ref 0–0.2)
NT-PROBNP SERPL-MCNC: 131.5 PG/ML (ref 0–450)
PH UR STRIP.AUTO: 6.5 [PH] (ref 5–8)
PLATELET # BLD AUTO: 404 10*3/MM3 (ref 140–450)
PMV BLD AUTO: 8.9 FL (ref 6–12)
POTASSIUM SERPL-SCNC: 3.9 MMOL/L (ref 3.5–5.2)
PROT SERPL-MCNC: 7.6 G/DL (ref 6–8.5)
PROT UR QL STRIP: NEGATIVE
RBC # BLD AUTO: 4.44 10*6/MM3 (ref 3.77–5.28)
RBC # UR STRIP: ABNORMAL /HPF
REF LAB TEST METHOD: ABNORMAL
SODIUM SERPL-SCNC: 139 MMOL/L (ref 136–145)
SP GR UR STRIP: 1.02 (ref 1–1.03)
SQUAMOUS #/AREA URNS HPF: ABNORMAL /HPF
TROPONIN I SERPL-MCNC: 0 NG/ML (ref 0–0.6)
TROPONIN I SERPL-MCNC: 0 NG/ML (ref 0–0.6)
UROBILINOGEN UR QL STRIP: ABNORMAL
WBC # UR STRIP: ABNORMAL /HPF
WBC NRBC COR # BLD: 9.02 10*3/MM3 (ref 3.4–10.8)
WHOLE BLOOD HOLD SPECIMEN: NORMAL
WHOLE BLOOD HOLD SPECIMEN: NORMAL

## 2022-03-15 PROCEDURE — 85025 COMPLETE CBC W/AUTO DIFF WBC: CPT

## 2022-03-15 PROCEDURE — 93005 ELECTROCARDIOGRAM TRACING: CPT

## 2022-03-15 PROCEDURE — 99283 EMERGENCY DEPT VISIT LOW MDM: CPT

## 2022-03-15 PROCEDURE — 83880 ASSAY OF NATRIURETIC PEPTIDE: CPT

## 2022-03-15 PROCEDURE — 83690 ASSAY OF LIPASE: CPT

## 2022-03-15 PROCEDURE — 80053 COMPREHEN METABOLIC PANEL: CPT

## 2022-03-15 PROCEDURE — 36415 COLL VENOUS BLD VENIPUNCTURE: CPT

## 2022-03-15 PROCEDURE — 71045 X-RAY EXAM CHEST 1 VIEW: CPT

## 2022-03-15 PROCEDURE — 82550 ASSAY OF CK (CPK): CPT

## 2022-03-15 PROCEDURE — 84484 ASSAY OF TROPONIN QUANT: CPT

## 2022-03-15 PROCEDURE — G0463 HOSPITAL OUTPT CLINIC VISIT: HCPCS

## 2022-03-15 PROCEDURE — 93005 ELECTROCARDIOGRAM TRACING: CPT | Performed by: EMERGENCY MEDICINE

## 2022-03-15 PROCEDURE — 81001 URINALYSIS AUTO W/SCOPE: CPT | Performed by: EMERGENCY MEDICINE

## 2022-03-15 PROCEDURE — 83735 ASSAY OF MAGNESIUM: CPT

## 2022-03-15 PROCEDURE — 82553 CREATINE MB FRACTION: CPT

## 2022-03-15 RX ORDER — ASPIRIN 81 MG/1
324 TABLET, CHEWABLE ORAL ONCE
Status: DISCONTINUED | OUTPATIENT
Start: 2022-03-15 | End: 2022-03-16 | Stop reason: HOSPADM

## 2022-03-15 RX ORDER — SODIUM CHLORIDE 0.9 % (FLUSH) 0.9 %
10 SYRINGE (ML) INJECTION AS NEEDED
Status: DISCONTINUED | OUTPATIENT
Start: 2022-03-15 | End: 2022-03-16 | Stop reason: HOSPADM

## 2022-03-15 NOTE — TELEPHONE ENCOUNTER
SPOKE TO MICHAEL    VERIFIED THAT PT(PATIENT) HAS AN APPT IN April    PER OFFICE STAFF, THEY WILL REACH OUT TO PT(PATIENT) TO SCHEDULE AN APPT    PT(PATIENT) HAD AN APPT YESTERDAY THAT WAS CANCELLED  Appointment with Ronak Domínguez MD (03/14/2022)    THE ABOVE WAS AN OLD OB APPT PRE BIRTH    OB/GYN OFFICE ADVISED FOR PT(PATIENT) TO RETURN TO LABOR AND DELIVERY AT THE HOSPITAL FOR ASSESSMENT UP TO 7 DAYS POST PARTUM

## 2022-03-15 NOTE — TELEPHONE ENCOUNTER
PT(PATIENT) REPORTS SHE HAS HAD HIGH BLOOD PRESSURE SINCE GIVING BIRTH 6 DAYS AGO    PT(PATIENT) STATES SHE HAS ATTEMPTED TO CONTACT DR MCHUGH OFFICE BUT HAS NOT RECEIVED A CALL BACK    OFFICE WAS ADVISED OF THE SITUATION AND REQUESTED TO CONTACT DR MCHUGH ON PT(PATIENT) BEHALF    PT(PATIENT) CHILD WAS SEEN IN OUR OFFICE TODAY

## 2022-03-15 NOTE — TELEPHONE ENCOUNTER
Thank you so much, Tish! Patient states that she is going to L&D related to her hypertensive BP readings

## 2022-03-15 NOTE — NURSING NOTE
Called Dr Elias concerning pt here for elevated blood pressure and chest tightening.  Dr Elias  Said to have her go to the emergency room.

## 2022-03-16 LAB
QT INTERVAL: 373 MS
QT INTERVAL: 381 MS
QT INTERVAL: 391 MS

## 2022-03-16 NOTE — ED PROVIDER NOTES
Time: 2140  Arrived by: Private vehicle  Chief Complaint: Chest pain, headache  History provided by: Patient    History of Present Illness:  Patient is a 36 y.o. year old female that presents to the emergency department with complaint of mild substernal chest discomfort at rest and some headache today.    When she was not feeling well earlier today and having a mild headache she did check her blood pressure and it was 150s over 90s, but she states most of the time she is checked it throughout the day it has been more like 120s or 130s systolic.    She denies any history of hypertension including no gestational hypertension and no preeclampsia previously.    She states she does have some increased stress that she just got home from her delivery and now has 4 young children all under the age of 6 so some significant stress at home noted.      Of note, she recently delivered 6 days ago, now in the postpartum period.    She does have history of premature atrial contractions, but no history of MI or cardiac stent.    She denies any history of DVT or PE.    No recent issues with acid reflux or indigestion reported.    She is not diabetic or any history of hypertension or tobacco abuse reported.        Similar Symptoms Previously: No  Recently seen: Yes, recently delivered baby 6 days ago      Patient Care Team  Primary Care Provider: Unknown    Past Medical History:   PACs, recently pregnant    Social History     Socioeconomic History   • Marital status:    Tobacco Use   • Smoking status: Never Smoker   • Smokeless tobacco: Never Used   Substance and Sexual Activity   • Alcohol use: Never   • Drug use: Never   • Sexual activity: Never         Allergies   Allergen Reactions   • Bactrim [Sulfamethoxazole-Trimethoprim] Hives   • Hazelnut (Filbert) Allergy Skin Test Hives   • Penicillins Hives   • Shellfish-Derived Products Hives   • Sulfa Antibiotics Hives     Past Medical History:   Diagnosis Date   • PAC (premature  "atrial contraction)      Past Surgical History:   Procedure Laterality Date   • EAR TUBES     • MOLE REMOVAL       Family History   Problem Relation Age of Onset   • Hypertension Father    • Stroke Father    • Hypertension Mother    • Breast cancer Paternal Grandmother    • Lung cancer Paternal Grandfather        Home Medications:  Prior to Admission medications    Medication Sig Start Date End Date Taking? Authorizing Provider   ibuprofen (ADVIL,MOTRIN) 800 MG tablet Take 1 tablet by mouth Every 8 (Eight) Hours for 7 days. 3/10/22 3/17/22 Yes Alejandro Crews MD   acetaminophen (TYLENOL) 500 MG tablet Take 2 tablets by mouth Every 6 (Six) Hours for 7 days. 3/10/22 3/17/22  Alejandro Crews MD   Prenatal Vit-Fe Fumarate-FA (prenatal vitamin 27-0.8) 27-0.8 MG tablet tablet Take 1 tablet by mouth Daily.    Provider, MD Keanu        Record Review:  I have reviewed the patient's records in [facility].     Review of Systems:  Review of Systems   I performed a 10 point review of systems which was all negative, except for the positives found in the HPI above.  Physical Exam:  /90 (Patient Position: Lying)   Pulse 56   Temp 98.1 °F (36.7 °C) (Oral)   Resp 18   Ht 170.2 cm (67\")   Wt 66.8 kg (147 lb 4.3 oz)   LMP 06/16/2021   SpO2 97%   Breastfeeding Yes   BMI 23.07 kg/m²     Physical Exam   General: Awake alert and in no obvious distress    HEENT: Head normocephalic atraumatic, eyes PERRLA EOMI, nose normal, oropharynx normal.    Neck: Supple full range of motion, no meningismus, no lymphadenopathy    Heart: Regular rate and rhythm, no murmurs or rubs, 2+ radial pulses bilaterally    Lungs: Clear to auscultation bilaterally without wheezes or crackles, no respiratory distress    Abdomen: Soft, nontender, nondistended, no rebound or guarding    Skin: Warm, dry, no rash    Musculoskeletal: Normal range of motion, no lower extremity edema    Neurologic: Oriented x3, no motor deficits no sensory " deficits    Psychiatric: Mood appears stable, no psychosis        Medications in the Emergency Department:  Medications - No data to display     Labs  Lab Results (last 24 hours)     Procedure Component Value Units Date/Time    CBC & Differential [[id number]]  (Normal) Collected: 03/15/22 1900    Specimen: Blood Updated: 03/15/22 1911    Narrative:      The following orders were created for panel order CBC & Differential.  Procedure                               Abnormality         Status                     ---------                               -----------         ------                     CBC Auto Differential[[id number]]        Normal              Final result                 Please view results for these tests on the individual orders.    Comprehensive Metabolic Panel [[id number]]  (Abnormal) Collected: 03/15/22 1900    Specimen: Blood Updated: 03/15/22 1937     Glucose 90 mg/dL      BUN 19 mg/dL      Creatinine 0.75 mg/dL      Sodium 139 mmol/L      Potassium 3.9 mmol/L      Chloride 104 mmol/L      CO2 24.9 mmol/L      Calcium 10.0 mg/dL      Total Protein 7.6 g/dL      Albumin 4.30 g/dL      ALT (SGPT) 21 U/L      AST (SGOT) 16 U/L      Alkaline Phosphatase 95 U/L      Total Bilirubin <0.2 mg/dL      Globulin 3.3 gm/dL      A/G Ratio 1.3 g/dL      BUN/Creatinine Ratio 25.3     Anion Gap 10.1 mmol/L      eGFR 106.6 mL/min/1.73      Comment: National Kidney Foundation and American Society of Nephrology (ASN) Task Force recommended calculation based on the Chronic Kidney Disease Epidemiology Collaboration (CKD-EPI) equation refit without adjustment for race.       Narrative:      GFR Normal >60  Chronic Kidney Disease <60  Kidney Failure <15      Lipase [[id number]]  (Normal) Collected: 03/15/22 1900    Specimen: Blood Updated: 03/15/22 1937     Lipase 27 U/L     BNP [[id number]]  (Normal) Collected: 03/15/22 1900    Specimen: Blood Updated: 03/15/22 1934     proBNP 131.5 pg/mL     Narrative:      Among patients  with dyspnea, NT-proBNP is highly sensitive for the detection of acute congestive heart failure. In addition NT-proBNP of <300 pg/ml effectively rules out acute congestive heart failure with 99% negative predictive value.    Results may be falsely decreased if patient taking Biotin.      Magnesium [[id number]]  (Normal) Collected: 03/15/22 1900    Specimen: Blood Updated: 03/15/22 1937     Magnesium 2.2 mg/dL     CK Total & CKMB [[id number]]  (Normal) Collected: 03/15/22 1900    Specimen: Blood Updated: 03/15/22 1937     CKMB 1.79 ng/mL      Creatine Kinase 67 U/L     Narrative:      CKMB results may be falsely decreased if patient taking Biotin.    CBC Auto Differential [[id number]]  (Normal) Collected: 03/15/22 1900    Specimen: Blood Updated: 03/15/22 1911     WBC 9.02 10*3/mm3      RBC 4.44 10*6/mm3      Hemoglobin 13.3 g/dL      Hematocrit 39.7 %      MCV 89.4 fL      MCH 30.0 pg      MCHC 33.5 g/dL      RDW 13.2 %      RDW-SD 43.4 fl      MPV 8.9 fL      Platelets 404 10*3/mm3      Neutrophil % 67.0 %      Lymphocyte % 24.1 %      Monocyte % 5.9 %      Eosinophil % 2.0 %      Basophil % 0.6 %      Immature Grans % 0.4 %      Neutrophils, Absolute 6.05 10*3/mm3      Lymphocytes, Absolute 2.17 10*3/mm3      Monocytes, Absolute 0.53 10*3/mm3      Eosinophils, Absolute 0.18 10*3/mm3      Basophils, Absolute 0.05 10*3/mm3      Immature Grans, Absolute 0.04 10*3/mm3      nRBC 0.0 /100 WBC     POC Troponin I [[id number]]  (Normal) Collected: 03/15/22 1902    Specimen: Blood Updated: 03/15/22 1914     Troponin I 0.00 ng/mL      Comment: Serial Number: [id number]:  [id number]       POC Troponin I [[id number]]  (Normal) Collected: 03/15/22 2103    Specimen: Blood Updated: 03/15/22 2115     Troponin I 0.00 ng/mL      Comment: Serial Number: [id number]:  [id number]       Urinalysis With Microscopic If Indicated (No Culture) - Urine, Clean Catch [[id number]]  (Abnormal) Collected: 03/15/22 8678    Specimen: Urine, Clean Catch  Updated: 03/15/22 2203     Color, UA Yellow     Appearance, UA Clear     pH, UA 6.5     Specific Gravity, UA 1.021     Glucose, UA Negative     Ketones, UA Negative     Bilirubin, UA Negative     Blood, UA Large (3+)     Protein, UA Negative     Leuk Esterase, UA Moderate (2+)     Nitrite, UA Negative     Urobilinogen, UA 0.2 E.U./dL    Urinalysis, Microscopic Only - Urine, Clean Catch [[id number]]  (Abnormal) Collected: 03/15/22 2142    Specimen: Urine, Clean Catch Updated: 03/15/22 2203     RBC, UA 21-30 /HPF      WBC, UA 13-20 /HPF      Bacteria, UA 2+ /HPF      Squamous Epithelial Cells, UA 0-2 /HPF      Hyaline Casts, UA 0-2 /LPF      Methodology Automated Microscopy           Imaging:  XR Chest 1 View    Result Date: 3/15/2022  PROCEDURE: XR CHEST 1 VW  COMPARISON: None  INDICATIONS: Chest Pain triage protocol  FINDINGS:  Two views (AP upright portable) of the chest reveal no cardiac enlargement and no acute infiltrate.  No pleural effusion or pneumothorax is seen.  External artifacts obscure detail.        No acute infiltrate is appreciated.       LINDA DUEÑAS JR, MD       Electronically Signed and Approved By: LINDA DUEÑAS JR, MD on 3/15/2022 at 22:15                Procedures:  Procedures    Progress     EKG:   I have reviewed and interpreted her twelve-lead EKG is normal sinus rhythm at 59 bpm, normal P waves, normal QRS, normal ST and T waves; no acute ischemia or ectopy.                       Medical Decision Making:  MDM   For my differential diagnosis of this patient's chest pain I considered acute coronary syndrome, pulmonary embolism, musculoskeletal chest wall pain, acid reflux with esophagitis, thoracic muscle strain, or anxiety induced chest pain.    Differential diagnosis for this patient's headache includes tension type headache, rebound headache, migraine headache, cluster headache, intracranial hemorrhage, temporal arteritis, headache secondary to trauma, hypertensive headache.        This  patient is a 36-year-old female now 6 days postpartum, presenting with mild intermittent chest discomfort and mild headache over the past couple of days since being home, in the setting of some elevated blood pressures.    Vital signs notable for mild hypertension, otherwise normal oxygenation at 98% on room air.    Her EKG looks normal without ectopy or ischemia and both troponins are 0.00 today.    The rest of her lab work looks unremarkable including normal LFTs and platelets.    I am checking urinalysis to look for signs of proteinuria, such as would possibly be seen with preeclampsia in the postpartum..    I do not see any evidence of postpartum cardiomyopathy given normal oxygenation no signs of volume overload on exam, normal troponin and proBNP, normal x-ray.      Clinically, she is well-appearing and blood pressure is now 131/90, and no signs of help syndrome on lab work, and no signs of proteinuria today on the urine sample, so I will have her follow-up with her doctor as an outpatient this week.      Final diagnoses:   Atypical chest pain   Episodic tension-type headache, not intractable        Disposition:  ED Disposition     ED Disposition   Discharge    Condition   Stable    Comment   --                    Art Nieves MD  03/16/22 1292

## 2022-03-16 NOTE — DISCHARGE INSTRUCTIONS
Your EKG and chest x-ray look normal, and all of your blood work here including your heart enzymes came back normal.    Also, your urinalysis today came back showing blood in the urine, but no protein whatsoever, so apparently you do not have preeclampsia in the postpartum period.

## 2022-03-21 ENCOUNTER — APPOINTMENT (OUTPATIENT)
Dept: LAB | Facility: HOSPITAL | Age: 37
End: 2022-03-21

## 2022-04-13 ENCOUNTER — POSTPARTUM VISIT (OUTPATIENT)
Dept: OBSTETRICS AND GYNECOLOGY | Facility: CLINIC | Age: 37
End: 2022-04-13

## 2022-04-13 VITALS
HEIGHT: 67 IN | BODY MASS INDEX: 21.82 KG/M2 | SYSTOLIC BLOOD PRESSURE: 111 MMHG | DIASTOLIC BLOOD PRESSURE: 76 MMHG | HEART RATE: 76 BPM | WEIGHT: 139 LBS

## 2022-04-13 PROBLEM — Z34.80 SUPERVISION OF OTHER NORMAL PREGNANCY, ANTEPARTUM: Status: RESOLVED | Noted: 2021-09-16 | Resolved: 2022-04-13

## 2022-04-13 PROCEDURE — 0503F POSTPARTUM CARE VISIT: CPT | Performed by: STUDENT IN AN ORGANIZED HEALTH CARE EDUCATION/TRAINING PROGRAM

## 2022-04-13 RX ORDER — IBUPROFEN 600 MG/1
600 TABLET ORAL EVERY 6 HOURS PRN
Qty: 30 TABLET | Refills: 1 | Status: SHIPPED | OUTPATIENT
Start: 2022-04-13

## 2022-04-13 NOTE — PROGRESS NOTES
"POSTPARTUM Follow Up Visit    CC:  Postpartum -  on 3/9/2022    HPI:      Antepartum or Postpartum complications: None; did have some elevated blood pressure postpartum.  She was evaluated not started on any medications.  Blood pressures have resolved.  Is having SI joint pain has been seeing a chiropractor over the last couple days.  Delivery type:    Perineum: 1st degree laceration  Feeding: Breast     Pain:  Yes; SI joint seeing chiropractor  Vaginal Bleeding:  No  EPDS score: 4  Plans for BC:  Condoms  Last PAP:   Last Completed Pap Smear          PAP SMEAR (Every 3 Years) Next due on 2021  SCANNED - PAP SMEAR    2016  SCANNED - PAP SMEAR                /76   Pulse 76   Ht 170.2 cm (67\")   Wt 63 kg (139 lb)   LMP 2021   Breastfeeding Yes   BMI 21.77 kg/m²     Physical Exam  Vitals and nursing note reviewed.   Constitutional:       General: She is not in acute distress.     Appearance: Normal appearance. She is not toxic-appearing.   HENT:      Head: Normocephalic and atraumatic.   Eyes:      Extraocular Movements: Extraocular movements intact.      Conjunctiva/sclera: Conjunctivae normal.   Cardiovascular:      Pulses: Normal pulses.   Pulmonary:      Effort: Pulmonary effort is normal.   Abdominal:      General: There is no distension.      Palpations: Abdomen is soft.      Tenderness: There is no abdominal tenderness.   Musculoskeletal:      Cervical back: Normal range of motion.   Skin:     General: Skin is warm and dry.   Neurological:      Mental Status: She is alert and oriented to person, place, and time.   Psychiatric:         Mood and Affect: Mood normal.         Behavior: Behavior normal.         Thought Content: Thought content normal.           ASSESSMENT AND PLAN:  Niesha Ro is a 36 y.o.  presenting for postpartum follow-up.  Patient with Pap smear up-to-date.  Is having musculoskeletal discomfort from delivery.  Recommendation for " ibuprofen and heat.  If pain persists beyond 3 months recommendation for imaging with primary care physician.  No range of motion or paresthesias present.    Diagnoses and all orders for this visit:    1. Postpartum follow-up (Primary)  -     ibuprofen (ADVIL,MOTRIN) 600 MG tablet; Take 1 tablet by mouth Every 6 (Six) Hours As Needed for Mild Pain .  Dispense: 30 tablet; Refill: 1        Counseling:  All birth control options reviewed in detail.  R/B/A/SE/E of each wrt pts PMHx and prior BC use.  May resume intercourse  May resume normal activities  Core strengthening exercises reviewed and recommended  Kegel exercises reviewed and recommended  Ok to return to work/school  Use backup contraception for 4 weeks after initiating chosen BC.      Follow Up:  Return in about 1 year (around 4/13/2023) for Annual physical.        Alejandro Crews MD  04/13/2022

## 2022-04-20 LAB
ALBUMIN SERPL-MCNC: 5.2 G/DL (ref 3.5–5.2)
ALBUMIN/GLOB SERPL: 1.7 G/DL
ALP SERPL-CCNC: 72 U/L (ref 39–117)
ALT SERPL W P-5'-P-CCNC: 8 U/L (ref 1–33)
ANION GAP SERPL CALCULATED.3IONS-SCNC: 14.1 MMOL/L (ref 5–15)
AST SERPL-CCNC: 13 U/L (ref 1–32)
BASOPHILS # BLD AUTO: 0.05 10*3/MM3 (ref 0–0.2)
BASOPHILS NFR BLD AUTO: 0.5 % (ref 0–1.5)
BILIRUB SERPL-MCNC: 0.2 MG/DL (ref 0–1.2)
BUN SERPL-MCNC: 18 MG/DL (ref 6–20)
BUN/CREAT SERPL: 20.9 (ref 7–25)
CALCIUM SPEC-SCNC: 10.2 MG/DL (ref 8.6–10.5)
CHLORIDE SERPL-SCNC: 102 MMOL/L (ref 98–107)
CO2 SERPL-SCNC: 24.9 MMOL/L (ref 22–29)
CREAT SERPL-MCNC: 0.86 MG/DL (ref 0.57–1)
DEPRECATED RDW RBC AUTO: 41.1 FL (ref 37–54)
EGFRCR SERPLBLD CKD-EPI 2021: 90.5 ML/MIN/1.73
EOSINOPHIL # BLD AUTO: 0.38 10*3/MM3 (ref 0–0.4)
EOSINOPHIL NFR BLD AUTO: 4.1 % (ref 0.3–6.2)
ERYTHROCYTE [DISTWIDTH] IN BLOOD BY AUTOMATED COUNT: 12.7 % (ref 12.3–15.4)
GLOBULIN UR ELPH-MCNC: 3.1 GM/DL
GLUCOSE SERPL-MCNC: 98 MG/DL (ref 65–99)
HCT VFR BLD AUTO: 38.5 % (ref 34–46.6)
HGB BLD-MCNC: 13 G/DL (ref 12–15.9)
HOLD SPECIMEN: NORMAL
HOLD SPECIMEN: NORMAL
IMM GRANULOCYTES # BLD AUTO: 0.01 10*3/MM3 (ref 0–0.05)
IMM GRANULOCYTES NFR BLD AUTO: 0.1 % (ref 0–0.5)
LYMPHOCYTES # BLD AUTO: 2.51 10*3/MM3 (ref 0.7–3.1)
LYMPHOCYTES NFR BLD AUTO: 27.2 % (ref 19.6–45.3)
MCH RBC QN AUTO: 30 PG (ref 26.6–33)
MCHC RBC AUTO-ENTMCNC: 33.8 G/DL (ref 31.5–35.7)
MCV RBC AUTO: 88.7 FL (ref 79–97)
MONOCYTES # BLD AUTO: 0.63 10*3/MM3 (ref 0.1–0.9)
MONOCYTES NFR BLD AUTO: 6.8 % (ref 5–12)
NEUTROPHILS NFR BLD AUTO: 5.66 10*3/MM3 (ref 1.7–7)
NEUTROPHILS NFR BLD AUTO: 61.3 % (ref 42.7–76)
NRBC BLD AUTO-RTO: 0 /100 WBC (ref 0–0.2)
PLATELET # BLD AUTO: 383 10*3/MM3 (ref 140–450)
PMV BLD AUTO: 8.7 FL (ref 6–12)
POTASSIUM SERPL-SCNC: 4.1 MMOL/L (ref 3.5–5.2)
PROT SERPL-MCNC: 8.3 G/DL (ref 6–8.5)
RBC # BLD AUTO: 4.34 10*6/MM3 (ref 3.77–5.28)
SODIUM SERPL-SCNC: 141 MMOL/L (ref 136–145)
WBC NRBC COR # BLD: 9.24 10*3/MM3 (ref 3.4–10.8)
WHOLE BLOOD HOLD SPECIMEN: NORMAL
WHOLE BLOOD HOLD SPECIMEN: NORMAL

## 2022-04-20 PROCEDURE — 99283 EMERGENCY DEPT VISIT LOW MDM: CPT

## 2022-04-20 PROCEDURE — 86140 C-REACTIVE PROTEIN: CPT | Performed by: EMERGENCY MEDICINE

## 2022-04-20 PROCEDURE — 85025 COMPLETE CBC W/AUTO DIFF WBC: CPT

## 2022-04-20 PROCEDURE — 36415 COLL VENOUS BLD VENIPUNCTURE: CPT

## 2022-04-20 PROCEDURE — 85652 RBC SED RATE AUTOMATED: CPT | Performed by: EMERGENCY MEDICINE

## 2022-04-20 PROCEDURE — 80053 COMPREHEN METABOLIC PANEL: CPT

## 2022-04-21 ENCOUNTER — HOSPITAL ENCOUNTER (EMERGENCY)
Facility: HOSPITAL | Age: 37
Discharge: HOME | End: 2022-04-21
Attending: EMERGENCY MEDICINE | Admitting: EMERGENCY MEDICINE

## 2022-04-21 ENCOUNTER — APPOINTMENT (OUTPATIENT)
Dept: GENERAL RADIOLOGY | Facility: HOSPITAL | Age: 37
End: 2022-04-21

## 2022-04-21 VITALS
WEIGHT: 140.21 LBS | DIASTOLIC BLOOD PRESSURE: 73 MMHG | BODY MASS INDEX: 22.01 KG/M2 | TEMPERATURE: 98.3 F | HEART RATE: 69 BPM | OXYGEN SATURATION: 99 % | RESPIRATION RATE: 18 BRPM | HEIGHT: 67 IN | SYSTOLIC BLOOD PRESSURE: 112 MMHG

## 2022-04-21 DIAGNOSIS — M25.559 HIP PAIN: ICD-10-CM

## 2022-04-21 DIAGNOSIS — M54.50 ACUTE RIGHT-SIDED LOW BACK PAIN, UNSPECIFIED WHETHER SCIATICA PRESENT: Primary | ICD-10-CM

## 2022-04-21 LAB
CRP SERPL-MCNC: 0.79 MG/DL (ref 0–0.5)
ERYTHROCYTE [SEDIMENTATION RATE] IN BLOOD: 13 MM/HR (ref 0–20)

## 2022-04-21 PROCEDURE — 72100 X-RAY EXAM L-S SPINE 2/3 VWS: CPT

## 2022-04-21 PROCEDURE — 73502 X-RAY EXAM HIP UNI 2-3 VIEWS: CPT

## 2022-04-21 NOTE — ED PROVIDER NOTES
Time: 2:17 AM EDT  Arrived by: private car  Chief Complaint: back/hip pain  History provided by: patient  History is limited by: N/A     History of Present Illness:  Patient is a 36 y.o. year old female that presents to the emergency department with back/right hip/coccyx pain.  Patient had a baby 6 weeks ago.  Over the last 2 weeks she has noticed pain in her right hip.  Pain radiates down her leg.  Movement makes pain worse.  She denies any known injury.  She been taking ibuprofen for pain.  HPI    Similar Symptoms Previously: no  Recently seen: no      Patient Care Team  Primary Care Provider: Provider, No Known    Past Medical History:   Allergies   Allergen Reactions   • Bactrim [Sulfamethoxazole-Trimethoprim] Hives   • Hazelnut (Filbert) Allergy Skin Test Hives   • Penicillins Hives   • Shellfish-Derived Products Hives   • Sulfa Antibiotics Hives     Past Medical History:   Diagnosis Date   • PAC (premature atrial contraction)      Past Surgical History:   Procedure Laterality Date   • EAR TUBES     • MOLE REMOVAL       Family History   Problem Relation Age of Onset   • Hypertension Father    • Stroke Father    • Hypertension Mother    • Breast cancer Paternal Grandmother    • Lung cancer Paternal Grandfather        Home Medications:  Prior to Admission medications    Medication Sig Start Date End Date Taking? Authorizing Provider   ibuprofen (ADVIL,MOTRIN) 600 MG tablet Take 1 tablet by mouth Every 6 (Six) Hours As Needed for Mild Pain . 4/13/22   Alejandro Crews MD   Prenatal Vit-Fe Fumarate-FA (prenatal vitamin 27-0.8) 27-0.8 MG tablet tablet Take 1 tablet by mouth Daily.    Provider, MD Keanu        Social History:   Social History     Tobacco Use   • Smoking status: Never Smoker   • Smokeless tobacco: Never Used   Substance Use Topics   • Alcohol use: Never   • Drug use: Never       Review of Systems:  Review of Systems   Constitutional: Negative for chills and fever.   HENT: Negative for  "congestion, ear pain and sore throat.    Eyes: Negative for pain.   Respiratory: Negative for cough, chest tightness and shortness of breath.    Cardiovascular: Negative for chest pain.   Gastrointestinal: Negative for abdominal pain, diarrhea, nausea and vomiting.   Genitourinary: Negative for flank pain and hematuria.   Musculoskeletal: Negative for joint swelling.   Skin: Negative for pallor.   Neurological: Negative for seizures and headaches.   All other systems reviewed and are negative.       Physical Exam:   /75   Pulse 66   Temp 98.3 °F (36.8 °C)   Resp 18   Ht 170.2 cm (67\")   Wt 63.6 kg (140 lb 3.4 oz)   SpO2 98%   Breastfeeding Yes   BMI 21.96 kg/m²     Physical Exam  Constitutional:       Appearance: Normal appearance.   HENT:      Head: Normocephalic and atraumatic.      Nose: Nose normal.      Mouth/Throat:      Mouth: Mucous membranes are moist.   Eyes:      Extraocular Movements: Extraocular movements intact.      Conjunctiva/sclera: Conjunctivae normal.      Pupils: Pupils are equal, round, and reactive to light.   Cardiovascular:      Rate and Rhythm: Normal rate and regular rhythm.      Pulses: Normal pulses.      Heart sounds: Normal heart sounds.   Pulmonary:      Effort: Pulmonary effort is normal.      Breath sounds: Normal breath sounds.   Abdominal:      General: There is no distension.      Palpations: Abdomen is soft.      Tenderness: There is no abdominal tenderness.   Musculoskeletal:         General: Normal range of motion.      Cervical back: Normal range of motion.      Comments: Mild pain with range of motion of right hip. No pain with range of motion of knee or ankle. Leg is neurovascularly intact. No midline c,t,l spine tenderness    Skin:     General: Skin is warm and dry.      Capillary Refill: Capillary refill takes less than 2 seconds.   Neurological:      General: No focal deficit present.      Mental Status: She is alert and oriented to person, place, and time. " Mental status is at baseline.   Psychiatric:         Mood and Affect: Mood normal.         Behavior: Behavior normal.                Medications in the Emergency Department:  Medications - No data to display     Labs  Lab Results (last 24 hours)     Procedure Component Value Units Date/Time    CBC & Differential [[id number]]  (Normal) Collected: 04/20/22 2235    Specimen: Blood Updated: 04/20/22 2243    Narrative:      The following orders were created for panel order CBC & Differential.  Procedure                               Abnormality         Status                     ---------                               -----------         ------                     CBC Auto Differential[[id number]]        Normal              Final result                 Please view results for these tests on the individual orders.    Comprehensive Metabolic Panel [[id number]] Collected: 04/20/22 2235    Specimen: Blood Updated: 04/20/22 2302     Glucose 98 mg/dL      BUN 18 mg/dL      Creatinine 0.86 mg/dL      Sodium 141 mmol/L      Potassium 4.1 mmol/L      Chloride 102 mmol/L      CO2 24.9 mmol/L      Calcium 10.2 mg/dL      Total Protein 8.3 g/dL      Albumin 5.20 g/dL      ALT (SGPT) 8 U/L      AST (SGOT) 13 U/L      Alkaline Phosphatase 72 U/L      Total Bilirubin 0.2 mg/dL      Globulin 3.1 gm/dL      A/G Ratio 1.7 g/dL      BUN/Creatinine Ratio 20.9     Anion Gap 14.1 mmol/L      eGFR 90.5 mL/min/1.73      Comment: National Kidney Foundation and American Society of Nephrology (ASN) Task Force recommended calculation based on the Chronic Kidney Disease Epidemiology Collaboration (CKD-EPI) equation refit without adjustment for race.       Narrative:      GFR Normal >60  Chronic Kidney Disease <60  Kidney Failure <15      CBC Auto Differential [[id number]]  (Normal) Collected: 04/20/22 2235    Specimen: Blood Updated: 04/20/22 2243     WBC 9.24 10*3/mm3      RBC 4.34 10*6/mm3      Hemoglobin 13.0 g/dL      Hematocrit 38.5 %      MCV 88.7  fL      MCH 30.0 pg      MCHC 33.8 g/dL      RDW 12.7 %      RDW-SD 41.1 fl      MPV 8.7 fL      Platelets 383 10*3/mm3      Neutrophil % 61.3 %      Lymphocyte % 27.2 %      Monocyte % 6.8 %      Eosinophil % 4.1 %      Basophil % 0.5 %      Immature Grans % 0.1 %      Neutrophils, Absolute 5.66 10*3/mm3      Lymphocytes, Absolute 2.51 10*3/mm3      Monocytes, Absolute 0.63 10*3/mm3      Eosinophils, Absolute 0.38 10*3/mm3      Basophils, Absolute 0.05 10*3/mm3      Immature Grans, Absolute 0.01 10*3/mm3      nRBC 0.0 /100 WBC     Sedimentation Rate [[id number]]  (Normal) Collected: 04/20/22 2235    Specimen: Blood Updated: 04/21/22 0130     Sed Rate 13 mm/hr     C-reactive Protein [[id number]]  (Abnormal) Collected: 04/20/22 2235    Specimen: Blood Updated: 04/21/22 0142     C-Reactive Protein 0.79 mg/dL            Imaging:  XR Spine Lumbar 2 or 3 View    Result Date: 4/21/2022  PROCEDURE: XR SPINE LUMBAR 2 OR 3 VW  COMPARISON: None.  INDICATIONS: LOWER BACK PAIN, RIGHT SIDED SI JOINT PAIN. POST PARTUM.  FINDINGS:   BONES: Normal.  No significant spondylosis, scoliosis, fracture, or visible bony lesion.  DISC SPACES: Normal.  No significant disc height narrowing, subluxation, or endplate abnormality.  PARASPINOUS: Negative.  No paraspinous abnormality is seen.  OTHER: There are 5 lumbar-like vertebrae.  There is nonspecific straightening of the lumbar lordosis.  There is possible incidental nephrolithiasis on the left with a 3 to 4 mm mid to lower calyceal calculus suspected.  If symptoms or clinical concerns persist, consider imaging follow-up.       No acute fracture or acute malalignment.  There may be left nephrolithiasis.     LINDA DUEÑAS JR, MD       Electronically Signed and Approved By: LINDA DUEÑAS JR, MD on 4/21/2022 at 3:48             XR Hip With or Without Pelvis 2 - 3 View Right    Result Date: 4/21/2022  PROCEDURE: XR HIP W OR WO PELVIS 2-3 VIEW RIGHT  COMPARISON: None.  INDICATIONS: RIGHT SI  JOINT/ HIP PAIN. POST PARTUM.  FINDINGS: 4 views were obtained. No acute fracture or acute malalignment is identified. If symptoms or clinical concerns persist, consider imaging follow-up.       No acute fracture or acute malalignment is identified.      LINDA DUEÑAS JR, MD       Electronically Signed and Approved By: LINDA DUEÑAS JR, MD on 4/21/2022 at 3:46               Procedures:  Procedures    Progress                            Medical Decision Making:  MDM  Number of Diagnoses or Management Options  Acute right-sided low back pain, unspecified whether sciatica present  Hip pain  Diagnosis management comments: Patient is afebrile nontoxic-appearing.  Vital signs are stable.  At time of evaluation she is lying in bed in no acute distress.  Patient does have mild discomfort with range of motion of her right hip.  No significant tenderness with palpitation.  Labs show no significant abnormality.  ESR within normal range.  CRP minimally elevated.  Patient is concerned for possible joint infection.  X-ray showed no acute findings.  Xray did show possible nephrolithiasis. Patient states she is aware of this she had a CT in the past and was told about this. She denies any symptoms related to this.   Suspicion for osteomyelitis or joint infection is low. Recommend follow-up with her primary physician and possible MRI if symptoms persist.  Discussed return precautions, discharge instructions and answered all her questions.       Amount and/or Complexity of Data Reviewed  Clinical lab tests: ordered and reviewed  Tests in the radiology section of CPT®: ordered and reviewed  Review and summarize past medical records: yes  Independent visualization of images, tracings, or specimens: yes    Risk of Complications, Morbidity, and/or Mortality  Presenting problems: low  Management options: low    Patient Progress  Patient progress: stable       Final diagnoses:   Acute right-sided low back pain, unspecified whether  sciatica present   Hip pain        Disposition:  ED Disposition     ED Disposition   Discharge    Condition   Stable    Comment   --                        Sandie De La Torre MD  04/21/22 [id number]

## 2022-04-23 ENCOUNTER — HOSPITAL ENCOUNTER (OUTPATIENT)
Dept: LACTATION | Facility: HOSPITAL | Age: 37
Discharge: HOME | End: 2022-04-23

## 2022-04-23 ENCOUNTER — DOCUMENTATION (OUTPATIENT)
Dept: LACTATION | Facility: HOSPITAL | Age: 37
End: 2022-04-23

## 2022-04-25 ENCOUNTER — TELEPHONE (OUTPATIENT)
Dept: OBSTETRICS AND GYNECOLOGY | Facility: CLINIC | Age: 37
End: 2022-04-25

## 2022-04-25 NOTE — TELEPHONE ENCOUNTER
Patient called on Friday left message. Patient states thinks she has mastitis. Returned patient call left message.

## 2022-08-28 ENCOUNTER — HOSPITAL ENCOUNTER (EMERGENCY)
Facility: HOSPITAL | Age: 37
Discharge: HOME | End: 2022-08-29
Attending: EMERGENCY MEDICINE | Admitting: EMERGENCY MEDICINE

## 2022-08-28 DIAGNOSIS — T78.40XA ALLERGIC REACTION, INITIAL ENCOUNTER: Primary | ICD-10-CM

## 2022-08-28 LAB
ALBUMIN SERPL-MCNC: 5.1 G/DL (ref 3.5–5.2)
ALBUMIN/GLOB SERPL: 1.8 G/DL
ALP SERPL-CCNC: 79 U/L (ref 39–117)
ALT SERPL W P-5'-P-CCNC: 12 U/L (ref 1–33)
ANION GAP SERPL CALCULATED.3IONS-SCNC: 12.8 MMOL/L (ref 5–15)
AST SERPL-CCNC: 16 U/L (ref 1–32)
BASOPHILS # BLD AUTO: 0.07 10*3/MM3 (ref 0–0.2)
BASOPHILS NFR BLD AUTO: 0.9 % (ref 0–1.5)
BILIRUB SERPL-MCNC: <0.2 MG/DL (ref 0–1.2)
BUN SERPL-MCNC: 17 MG/DL (ref 6–20)
BUN/CREAT SERPL: 22.4 (ref 7–25)
CALCIUM SPEC-SCNC: 10.2 MG/DL (ref 8.6–10.5)
CHLORIDE SERPL-SCNC: 102 MMOL/L (ref 98–107)
CO2 SERPL-SCNC: 25.2 MMOL/L (ref 22–29)
CREAT SERPL-MCNC: 0.76 MG/DL (ref 0.57–1)
DEPRECATED RDW RBC AUTO: 43 FL (ref 37–54)
EGFRCR SERPLBLD CKD-EPI 2021: 104.3 ML/MIN/1.73
EOSINOPHIL # BLD AUTO: 0.23 10*3/MM3 (ref 0–0.4)
EOSINOPHIL NFR BLD AUTO: 3 % (ref 0.3–6.2)
ERYTHROCYTE [DISTWIDTH] IN BLOOD BY AUTOMATED COUNT: 13.4 % (ref 12.3–15.4)
GLOBULIN UR ELPH-MCNC: 2.9 GM/DL
GLUCOSE SERPL-MCNC: 86 MG/DL (ref 65–99)
HCT VFR BLD AUTO: 38.2 % (ref 34–46.6)
HGB BLD-MCNC: 12.4 G/DL (ref 12–15.9)
HOLD SPECIMEN: NORMAL
HOLD SPECIMEN: NORMAL
IMM GRANULOCYTES # BLD AUTO: 0.02 10*3/MM3 (ref 0–0.05)
IMM GRANULOCYTES NFR BLD AUTO: 0.3 % (ref 0–0.5)
LYMPHOCYTES # BLD AUTO: 2.25 10*3/MM3 (ref 0.7–3.1)
LYMPHOCYTES NFR BLD AUTO: 29.7 % (ref 19.6–45.3)
MCH RBC QN AUTO: 28.3 PG (ref 26.6–33)
MCHC RBC AUTO-ENTMCNC: 32.5 G/DL (ref 31.5–35.7)
MCV RBC AUTO: 87.2 FL (ref 79–97)
MONOCYTES # BLD AUTO: 0.56 10*3/MM3 (ref 0.1–0.9)
MONOCYTES NFR BLD AUTO: 7.4 % (ref 5–12)
NEUTROPHILS NFR BLD AUTO: 4.45 10*3/MM3 (ref 1.7–7)
NEUTROPHILS NFR BLD AUTO: 58.7 % (ref 42.7–76)
NRBC BLD AUTO-RTO: 0 /100 WBC (ref 0–0.2)
PLATELET # BLD AUTO: 380 10*3/MM3 (ref 140–450)
PMV BLD AUTO: 9.1 FL (ref 6–12)
POTASSIUM SERPL-SCNC: 4 MMOL/L (ref 3.5–5.2)
PROT SERPL-MCNC: 8 G/DL (ref 6–8.5)
RBC # BLD AUTO: 4.38 10*6/MM3 (ref 3.77–5.28)
SODIUM SERPL-SCNC: 140 MMOL/L (ref 136–145)
WBC NRBC COR # BLD: 7.58 10*3/MM3 (ref 3.4–10.8)
WHOLE BLOOD HOLD COAG: NORMAL
WHOLE BLOOD HOLD SPECIMEN: NORMAL

## 2022-08-28 PROCEDURE — 80053 COMPREHEN METABOLIC PANEL: CPT

## 2022-08-28 PROCEDURE — 85025 COMPLETE CBC W/AUTO DIFF WBC: CPT

## 2022-08-28 PROCEDURE — 99283 EMERGENCY DEPT VISIT LOW MDM: CPT

## 2022-08-28 PROCEDURE — 36415 COLL VENOUS BLD VENIPUNCTURE: CPT

## 2022-08-28 RX ORDER — SODIUM CHLORIDE 0.9 % (FLUSH) 0.9 %
10 SYRINGE (ML) INJECTION AS NEEDED
Status: DISCONTINUED | OUTPATIENT
Start: 2022-08-28 | End: 2022-08-29 | Stop reason: HOSPADM

## 2022-08-29 VITALS
HEIGHT: 67 IN | SYSTOLIC BLOOD PRESSURE: 120 MMHG | TEMPERATURE: 97.8 F | OXYGEN SATURATION: 99 % | BODY MASS INDEX: 20.1 KG/M2 | DIASTOLIC BLOOD PRESSURE: 68 MMHG | HEART RATE: 74 BPM | WEIGHT: 128.09 LBS | RESPIRATION RATE: 14 BRPM

## 2022-08-29 PROCEDURE — 96375 TX/PRO/DX INJ NEW DRUG ADDON: CPT

## 2022-08-29 PROCEDURE — 96374 THER/PROPH/DIAG INJ IV PUSH: CPT

## 2022-08-29 PROCEDURE — 25010000002 METHYLPREDNISOLONE PER 125 MG: Performed by: NURSE PRACTITIONER

## 2022-08-29 RX ORDER — METHYLPREDNISOLONE SODIUM SUCCINATE 125 MG/2ML
125 INJECTION, POWDER, LYOPHILIZED, FOR SOLUTION INTRAMUSCULAR; INTRAVENOUS ONCE
Status: COMPLETED | OUTPATIENT
Start: 2022-08-29 | End: 2022-08-29

## 2022-08-29 RX ORDER — FAMOTIDINE 10 MG/ML
20 INJECTION, SOLUTION INTRAVENOUS ONCE
Status: COMPLETED | OUTPATIENT
Start: 2022-08-29 | End: 2022-08-29

## 2022-08-29 RX ORDER — FAMOTIDINE 20 MG/1
20 TABLET, FILM COATED ORAL DAILY
Qty: 7 TABLET | Refills: 0 | Status: SHIPPED | OUTPATIENT
Start: 2022-08-29

## 2022-08-29 RX ADMIN — METHYLPREDNISOLONE SODIUM SUCCINATE 125 MG: 125 INJECTION, POWDER, FOR SOLUTION INTRAMUSCULAR; INTRAVENOUS at 00:21

## 2022-08-29 RX ADMIN — FAMOTIDINE 20 MG: 10 INJECTION INTRAVENOUS at 00:22

## 2023-04-01 ENCOUNTER — HOSPITAL ENCOUNTER (EMERGENCY)
Facility: HOSPITAL | Age: 38
Discharge: ANOTHER HOSPITAL | End: 2023-04-01
Attending: EMERGENCY MEDICINE | Admitting: EMERGENCY MEDICINE
Payer: OTHER GOVERNMENT

## 2023-04-01 ENCOUNTER — APPOINTMENT (OUTPATIENT)
Dept: MRI IMAGING | Facility: HOSPITAL | Age: 38
End: 2023-04-01
Payer: OTHER GOVERNMENT

## 2023-04-01 ENCOUNTER — APPOINTMENT (OUTPATIENT)
Dept: GENERAL RADIOLOGY | Facility: HOSPITAL | Age: 38
End: 2023-04-01
Payer: OTHER GOVERNMENT

## 2023-04-01 VITALS
DIASTOLIC BLOOD PRESSURE: 71 MMHG | SYSTOLIC BLOOD PRESSURE: 103 MMHG | HEIGHT: 67 IN | HEART RATE: 109 BPM | TEMPERATURE: 98.5 F | RESPIRATION RATE: 16 BRPM | WEIGHT: 121.47 LBS | OXYGEN SATURATION: 97 % | BODY MASS INDEX: 19.07 KG/M2

## 2023-04-01 DIAGNOSIS — M46.1 SACROILIITIS: Primary | ICD-10-CM

## 2023-04-01 LAB
ALBUMIN SERPL-MCNC: 5 G/DL (ref 3.5–5.2)
ALBUMIN/GLOB SERPL: 1.4 G/DL
ALP SERPL-CCNC: 86 U/L (ref 39–117)
ALT SERPL W P-5'-P-CCNC: 11 U/L (ref 1–33)
ANION GAP SERPL CALCULATED.3IONS-SCNC: 13.5 MMOL/L (ref 5–15)
AST SERPL-CCNC: 17 U/L (ref 1–32)
BASOPHILS # BLD AUTO: 0.1 10*3/MM3 (ref 0–0.2)
BASOPHILS NFR BLD AUTO: 0.4 % (ref 0–1.5)
BILIRUB SERPL-MCNC: 0.7 MG/DL (ref 0–1.2)
BUN SERPL-MCNC: 14 MG/DL (ref 6–20)
BUN/CREAT SERPL: 19.2 (ref 7–25)
CALCIUM SPEC-SCNC: 9.8 MG/DL (ref 8.6–10.5)
CHLORIDE SERPL-SCNC: 101 MMOL/L (ref 98–107)
CO2 SERPL-SCNC: 24.5 MMOL/L (ref 22–29)
CREAT SERPL-MCNC: 0.73 MG/DL (ref 0.57–1)
CRP SERPL-MCNC: 1.48 MG/DL (ref 0–0.5)
D-LACTATE SERPL-SCNC: 1.3 MMOL/L (ref 0.5–2)
DEPRECATED RDW RBC AUTO: 39 FL (ref 37–54)
EGFRCR SERPLBLD CKD-EPI 2021: 109.5 ML/MIN/1.73
EOSINOPHIL # BLD AUTO: 0.08 10*3/MM3 (ref 0–0.4)
EOSINOPHIL NFR BLD AUTO: 0.3 % (ref 0.3–6.2)
ERYTHROCYTE [DISTWIDTH] IN BLOOD BY AUTOMATED COUNT: 13 % (ref 12.3–15.4)
ERYTHROCYTE [SEDIMENTATION RATE] IN BLOOD: 49 MM/HR (ref 0–20)
FLUAV AG NPH QL: NEGATIVE
FLUBV AG NPH QL IA: NEGATIVE
GLOBULIN UR ELPH-MCNC: 3.5 GM/DL
GLUCOSE SERPL-MCNC: 100 MG/DL (ref 65–99)
HCT VFR BLD AUTO: 42 % (ref 34–46.6)
HGB BLD-MCNC: 13.9 G/DL (ref 12–15.9)
HOLD SPECIMEN: NORMAL
HOLD SPECIMEN: NORMAL
IMM GRANULOCYTES # BLD AUTO: 0.21 10*3/MM3 (ref 0–0.05)
IMM GRANULOCYTES NFR BLD AUTO: 0.8 % (ref 0–0.5)
LYMPHOCYTES # BLD AUTO: 0.88 10*3/MM3 (ref 0.7–3.1)
LYMPHOCYTES NFR BLD AUTO: 3.2 % (ref 19.6–45.3)
MAGNESIUM SERPL-MCNC: 1.8 MG/DL (ref 1.6–2.6)
MCH RBC QN AUTO: 27.5 PG (ref 26.6–33)
MCHC RBC AUTO-ENTMCNC: 33.1 G/DL (ref 31.5–35.7)
MCV RBC AUTO: 83 FL (ref 79–97)
MONOCYTES # BLD AUTO: 0.97 10*3/MM3 (ref 0.1–0.9)
MONOCYTES NFR BLD AUTO: 3.5 % (ref 5–12)
NEUTROPHILS NFR BLD AUTO: 25.12 10*3/MM3 (ref 1.7–7)
NEUTROPHILS NFR BLD AUTO: 91.8 % (ref 42.7–76)
NRBC BLD AUTO-RTO: 0 /100 WBC (ref 0–0.2)
PLATELET # BLD AUTO: 428 10*3/MM3 (ref 140–450)
PMV BLD AUTO: 8.3 FL (ref 6–12)
POTASSIUM SERPL-SCNC: 3.9 MMOL/L (ref 3.5–5.2)
PROT SERPL-MCNC: 8.5 G/DL (ref 6–8.5)
RBC # BLD AUTO: 5.06 10*6/MM3 (ref 3.77–5.28)
S PYO AG THROAT QL: NEGATIVE
SARS-COV-2 RNA RESP QL NAA+PROBE: NOT DETECTED
SODIUM SERPL-SCNC: 139 MMOL/L (ref 136–145)
TROPONIN T SERPL HS-MCNC: <6 NG/L
WBC NRBC COR # BLD: 27.36 10*3/MM3 (ref 3.4–10.8)
WHOLE BLOOD HOLD COAG: NORMAL
WHOLE BLOOD HOLD SPECIMEN: NORMAL

## 2023-04-01 PROCEDURE — 87880 STREP A ASSAY W/OPTIC: CPT | Performed by: EMERGENCY MEDICINE

## 2023-04-01 PROCEDURE — 84484 ASSAY OF TROPONIN QUANT: CPT

## 2023-04-01 PROCEDURE — 96375 TX/PRO/DX INJ NEW DRUG ADDON: CPT

## 2023-04-01 PROCEDURE — 93005 ELECTROCARDIOGRAM TRACING: CPT | Performed by: EMERGENCY MEDICINE

## 2023-04-01 PROCEDURE — 93005 ELECTROCARDIOGRAM TRACING: CPT

## 2023-04-01 PROCEDURE — A9577 INJ MULTIHANCE: HCPCS | Performed by: EMERGENCY MEDICINE

## 2023-04-01 PROCEDURE — 87040 BLOOD CULTURE FOR BACTERIA: CPT | Performed by: EMERGENCY MEDICINE

## 2023-04-01 PROCEDURE — 86140 C-REACTIVE PROTEIN: CPT | Performed by: EMERGENCY MEDICINE

## 2023-04-01 PROCEDURE — 25010000002 CEFEPIME PER 500 MG: Performed by: EMERGENCY MEDICINE

## 2023-04-01 PROCEDURE — 87081 CULTURE SCREEN ONLY: CPT | Performed by: EMERGENCY MEDICINE

## 2023-04-01 PROCEDURE — 25010000002 ONDANSETRON PER 1 MG: Performed by: EMERGENCY MEDICINE

## 2023-04-01 PROCEDURE — 93010 ELECTROCARDIOGRAM REPORT: CPT | Performed by: INTERNAL MEDICINE

## 2023-04-01 PROCEDURE — 72158 MRI LUMBAR SPINE W/O & W/DYE: CPT

## 2023-04-01 PROCEDURE — 73723 MRI JOINT LWR EXTR W/O&W/DYE: CPT

## 2023-04-01 PROCEDURE — 36415 COLL VENOUS BLD VENIPUNCTURE: CPT

## 2023-04-01 PROCEDURE — 80053 COMPREHEN METABOLIC PANEL: CPT

## 2023-04-01 PROCEDURE — 25010000002 DIPHENHYDRAMINE PER 50 MG: Performed by: EMERGENCY MEDICINE

## 2023-04-01 PROCEDURE — 71045 X-RAY EXAM CHEST 1 VIEW: CPT

## 2023-04-01 PROCEDURE — 25010000002 VANCOMYCIN 5 G RECONSTITUTED SOLUTION: Performed by: EMERGENCY MEDICINE

## 2023-04-01 PROCEDURE — 99285 EMERGENCY DEPT VISIT HI MDM: CPT

## 2023-04-01 PROCEDURE — 85025 COMPLETE CBC W/AUTO DIFF WBC: CPT

## 2023-04-01 PROCEDURE — 96367 TX/PROPH/DG ADDL SEQ IV INF: CPT

## 2023-04-01 PROCEDURE — 0 GADOBENATE DIMEGLUMINE 529 MG/ML SOLUTION: Performed by: EMERGENCY MEDICINE

## 2023-04-01 PROCEDURE — 83605 ASSAY OF LACTIC ACID: CPT | Performed by: EMERGENCY MEDICINE

## 2023-04-01 PROCEDURE — 83735 ASSAY OF MAGNESIUM: CPT

## 2023-04-01 PROCEDURE — 96365 THER/PROPH/DIAG IV INF INIT: CPT

## 2023-04-01 PROCEDURE — 87804 INFLUENZA ASSAY W/OPTIC: CPT | Performed by: EMERGENCY MEDICINE

## 2023-04-01 PROCEDURE — U0004 COV-19 TEST NON-CDC HGH THRU: HCPCS | Performed by: EMERGENCY MEDICINE

## 2023-04-01 PROCEDURE — 85652 RBC SED RATE AUTOMATED: CPT | Performed by: EMERGENCY MEDICINE

## 2023-04-01 PROCEDURE — 99284 EMERGENCY DEPT VISIT MOD MDM: CPT

## 2023-04-01 RX ORDER — HYDROCODONE BITARTRATE AND ACETAMINOPHEN 7.5; 325 MG/1; MG/1
1 TABLET ORAL ONCE
Status: COMPLETED | OUTPATIENT
Start: 2023-04-01 | End: 2023-04-01

## 2023-04-01 RX ORDER — CEFEPIME 1 G/50ML
2 INJECTION, SOLUTION INTRAVENOUS ONCE
Status: COMPLETED | OUTPATIENT
Start: 2023-04-01 | End: 2023-04-01

## 2023-04-01 RX ORDER — ONDANSETRON 2 MG/ML
4 INJECTION INTRAMUSCULAR; INTRAVENOUS ONCE
Status: COMPLETED | OUTPATIENT
Start: 2023-04-01 | End: 2023-04-01

## 2023-04-01 RX ORDER — DIPHENHYDRAMINE HYDROCHLORIDE 50 MG/ML
25 INJECTION INTRAMUSCULAR; INTRAVENOUS ONCE
Status: COMPLETED | OUTPATIENT
Start: 2023-04-01 | End: 2023-04-01

## 2023-04-01 RX ORDER — SODIUM CHLORIDE 0.9 % (FLUSH) 0.9 %
10 SYRINGE (ML) INJECTION AS NEEDED
Status: DISCONTINUED | OUTPATIENT
Start: 2023-04-01 | End: 2023-04-01 | Stop reason: HOSPADM

## 2023-04-01 RX ADMIN — DIPHENHYDRAMINE HYDROCHLORIDE 25 MG: 50 INJECTION, SOLUTION INTRAMUSCULAR; INTRAVENOUS at 18:52

## 2023-04-01 RX ADMIN — HYDROCODONE BITARTRATE AND ACETAMINOPHEN 1 TABLET: 7.5; 325 TABLET ORAL at 19:30

## 2023-04-01 RX ADMIN — SODIUM CHLORIDE 1000 ML: 9 INJECTION, SOLUTION INTRAVENOUS at 12:40

## 2023-04-01 RX ADMIN — VANCOMYCIN HYDROCHLORIDE 1000 MG: 5 INJECTION, POWDER, LYOPHILIZED, FOR SOLUTION INTRAVENOUS at 17:27

## 2023-04-01 RX ADMIN — GADOBENATE DIMEGLUMINE 10 ML: 529 INJECTION, SOLUTION INTRAVENOUS at 14:58

## 2023-04-01 RX ADMIN — CEFEPIME 2 G: 1 INJECTION, SOLUTION INTRAVENOUS at 16:57

## 2023-04-01 RX ADMIN — ONDANSETRON 4 MG: 2 INJECTION INTRAMUSCULAR; INTRAVENOUS at 16:58

## 2023-04-01 NOTE — ED NOTES
Updated nurse on 9 frazier that patient will be coming POV. Patient agrees to go directly to the hospital.

## 2023-04-01 NOTE — ED PROVIDER NOTES
"Time: 12:14 PM EDT  Date of encounter:  4/1/2023  Independent Historian/Clinical History and Information was obtained by:   Patient  Chief Complaint: Hip Pain    History is limited by: N/A    History of Present Illness:  Patient is a 37 y.o. year old female who presents to the emergency department for evaluation of hip pain, cough and congestion, and body aches. Pt confirms R hip pain, onset of 1 month. Pt denies injury, stating \"don't know what I've done.\"Pt reports pain radiating down the backside and worsens with movement, resulting in limited range of motion. Pt confirms fever at home. Pt denies nausea and vomiting. Pt reports losing weight since nursing her son.    HPI    Patient Care Team  Primary Care Provider: Bunny Galvan MD    Past Medical History:     Allergies   Allergen Reactions   • Bactrim [Sulfamethoxazole-Trimethoprim] Hives   • Hazelnut (Filbert) Allergy Skin Test Hives   • Penicillins Hives   • Shellfish-Derived Products Hives   • Sulfa Antibiotics Hives   • Vancomycin Rash     Past Medical History:   Diagnosis Date   • PAC (premature atrial contraction)      Past Surgical History:   Procedure Laterality Date   • EAR TUBES     • MOLE REMOVAL       Family History   Problem Relation Age of Onset   • Hypertension Father    • Stroke Father    • Hypertension Mother    • Breast cancer Paternal Grandmother    • Lung cancer Paternal Grandfather        Home Medications:  Prior to Admission medications    Medication Sig Start Date End Date Taking? Authorizing Provider   famotidine (PEPCID) 20 MG tablet Take 1 tablet by mouth Daily. 8/29/22   Thu Solorio APRN   ibuprofen (ADVIL,MOTRIN) 600 MG tablet Take 1 tablet by mouth Every 6 (Six) Hours As Needed for Mild Pain . 4/13/22   Alejandro Crews MD   Prenatal Vit-Fe Fumarate-FA (prenatal vitamin 27-0.8) 27-0.8 MG tablet tablet Take 1 tablet by mouth Daily.    Provider, MD Keanu        Social History:   Social History     Tobacco Use   • " "Smoking status: Never   • Smokeless tobacco: Never   Substance Use Topics   • Alcohol use: Never   • Drug use: Never         Review of Systems:  Review of Systems   Constitutional: Positive for fever. Negative for chills.   HENT: Positive for congestion. Negative for rhinorrhea and sore throat.    Eyes: Negative for photophobia.   Respiratory: Positive for cough. Negative for apnea, chest tightness and shortness of breath.    Cardiovascular: Negative for chest pain and palpitations.   Gastrointestinal: Negative for abdominal pain, diarrhea, nausea and vomiting.   Endocrine: Negative.    Genitourinary: Negative for difficulty urinating and dysuria.   Musculoskeletal: Positive for myalgias. Negative for back pain and joint swelling.   Skin: Negative for color change and wound.   Allergic/Immunologic: Negative.    Neurological: Negative for seizures and headaches.   Psychiatric/Behavioral: Negative.    All other systems reviewed and are negative.       Physical Exam:  /71   Pulse 109   Temp 98.5 °F (36.9 °C) (Oral)   Resp 16   Ht 170.2 cm (67\")   Wt 55.1 kg (121 lb 7.6 oz)   SpO2 97%   BMI 19.03 kg/m²     Physical Exam  Vitals and nursing note reviewed.   Constitutional:       General: She is awake.      Appearance: Normal appearance.   HENT:      Head: Normocephalic and atraumatic.      Nose: Nose normal.      Mouth/Throat:      Mouth: Mucous membranes are moist.   Eyes:      Extraocular Movements: Extraocular movements intact.      Pupils: Pupils are equal, round, and reactive to light.   Cardiovascular:      Rate and Rhythm: Regular rhythm. Tachycardia present.      Heart sounds: Normal heart sounds.      Comments: Sinus tachycardia at 130  Pulmonary:      Effort: Pulmonary effort is normal. No respiratory distress.      Breath sounds: Normal breath sounds. No wheezing, rhonchi or rales.   Abdominal:      General: Bowel sounds are normal.      Palpations: Abdomen is soft.      Tenderness: There is no " abdominal tenderness. There is no guarding or rebound.      Comments: No rigidity   Musculoskeletal:         General: No tenderness. Normal range of motion.      Cervical back: Normal range of motion and neck supple.      Comments: Pain with straight leg raise on right side at 10 degrees. Pt has no pain with passive ROM to RLE with straight leg raise, internal or external rotation.   Skin:     General: Skin is warm and dry.      Coloration: Skin is not jaundiced.   Neurological:      General: No focal deficit present.      Mental Status: She is alert and oriented to person, place, and time. Mental status is at baseline.      Sensory: Sensation is intact.      Motor: Motor function is intact.      Coordination: Coordination is intact.   Psychiatric:         Attention and Perception: Attention and perception normal.         Mood and Affect: Mood and affect normal.         Speech: Speech normal.         Behavior: Behavior normal.         Judgment: Judgment normal.                  Procedures:  Procedures      Medical Decision Making:      Comorbidities that affect care:    Premature atrial contraction    External Notes reviewed:    Hospital discharge summary, past ED notes      The following orders were placed and all results were independently analyzed by me:  Orders Placed This Encounter   Procedures   • Influenza Antigen, Rapid - Swab, Nasopharynx   • COVID-19,APTIMA PANTHER(GEOVANNY),[facility]/ [facility], NP/OP SWAB IN UTM/VTM/SALINE TRANSPORT MEDIA,24 HR TAT - Swab, Nasopharynx   • Rapid Strep A Screen - Swab, Throat   • Blood Culture - Blood,   • Blood Culture - Blood,   • Beta Strep Culture, Throat - Swab, Throat   • XR Chest 1 View   • MRI Lumbar Spine With & Without Contrast   • MRI Hip Right With & Without Contrast   • [place] Draw   • Comprehensive Metabolic Panel   • Magnesium   • Single High Sensitivity Troponin T   • CBC Auto Differential   • Lactic Acid, Plasma   • C-reactive Protein   • Sedimentation Rate   • NPO  Diet NPO Type: Strict NPO   • Undress & Gown   • Cardiac Monitoring   • Continuous Pulse Oximetry   • Orthopedics (on-call MD unless specified)   • IP General Consult (Use specialty-specific consult if known)   • Oxygen Therapy- Nasal Cannula; 2 LPM; Titrate for SPO2: 92%   • ECG 12 Lead ED Triage Standing Order; Dysrhythmia   • Insert Peripheral IV   • CBC & Differential   • Green Top (Gel)   • Lavender Top   • Gold Top - SST   • Light Blue Top       Medications Given in the Emergency Department:  Medications   sodium chloride 0.9 % flush 10 mL (has no administration in time range)   HYDROcodone-acetaminophen (NORCO) 7.5-325 MG per tablet 1 tablet (has no administration in time range)   sodium chloride 0.9 % bolus 1,000 mL (0 mL Intravenous Stopped 4/1/23 1310)   gadobenate dimeglumine (MULTIHANCE) injection 10 mL (10 mL Intravenous Given 4/1/23 1458)   cefepime (MAXIPIME) IVPB 2 g (premix) in D5 (0 g Intravenous Stopped 4/1/23 1727)   vancomycin 1000 mg/250 mL 0.9% NS IVPB ([facility]) (0 mg Intravenous Stopped 4/1/23 1842)   ondansetron (ZOFRAN) injection 4 mg (4 mg Intravenous Given 4/1/23 1658)   diphenhydrAMINE (BENADRYL) injection 25 mg (25 mg Intravenous Given 4/1/23 1852)        ED Course:    ED Course as of 04/01/23 1929   Sat Apr 01, 2023   1212 I have personally interpreted the EKG today and it shows no evidence of any acute ischemia or heart arrhythmia. [RP]   1230 Lab review: Most recent white blood cell count in our EMR shows 7.58 on 8/28/2022. [RP]   1230 0 pain with passive range of motion to the right hip.  Patient denies any cellulitis or swelling to the hip joint.  When asked to point where her pain is greatest she points to her upper medial right gluteal area and endorses sciatica with straight leg raise. [RP]   1618 Case discussed with orthopedics on-call, Dr. Scott.  He recommends transfer to a tertiary care center as he and his colleagues do not take care of this type of thing.  Mentions need  for operative washout along with infectious disease consultation. [RP]   1637 HS Troponin T: <6 [RP]   1642 Case discussed with orthopedics on-call with [facility], .  He feels the patient can receive IV antibiotics with the plan for interventional radiology aspiration to confirm the diagnosis.  Antibiotic should not be held until that time though.  He states that orthopedics would not go in to do any operative washout in this case.  Recommends working in conjunction with infectious disease. [RP]   1646 I called radiology and confirm that we do not have interventional radiology capabilities today. [RP]   1654 Case discussed with hospitalist at [facility], Dr. Allen who accepts transfer. [RP]      ED Course User Index  [RP] Clark Pan MD       Labs:    Lab Results (last 24 hours)     Procedure Component Value Units Date/Time    CBC & Differential [[id number]]  (Abnormal) Collected: 04/01/23 1040    Specimen: Blood Updated: 04/01/23 1049    Narrative:      The following orders were created for panel order CBC & Differential.  Procedure                               Abnormality         Status                     ---------                               -----------         ------                     CBC Auto Differential[[id number]]        Abnormal            Final result                 Please view results for these tests on the individual orders.    Comprehensive Metabolic Panel [[id number]]  (Abnormal) Collected: 04/01/23 1040    Specimen: Blood Updated: 04/01/23 1114     Glucose 100 mg/dL      BUN 14 mg/dL      Creatinine 0.73 mg/dL      Sodium 139 mmol/L      Potassium 3.9 mmol/L      Chloride 101 mmol/L      CO2 24.5 mmol/L      Calcium 9.8 mg/dL      Total Protein 8.5 g/dL      Albumin 5.0 g/dL      ALT (SGPT) 11 U/L      AST (SGOT) 17 U/L      Alkaline Phosphatase 86 U/L      Total Bilirubin 0.7 mg/dL      Globulin 3.5 gm/dL      A/G Ratio 1.4 g/dL      BUN/Creatinine Ratio 19.2      Anion Gap 13.5 mmol/L      eGFR 109.5 mL/min/1.73     Narrative:      GFR Normal >60  Chronic Kidney Disease <60  Kidney Failure <15      Magnesium [[id number]]  (Normal) Collected: 04/01/23 1040    Specimen: Blood Updated: 04/01/23 1114     Magnesium 1.8 mg/dL     Single High Sensitivity Troponin T [[id number]]  (Normal) Collected: 04/01/23 1040    Specimen: Blood Updated: 04/01/23 1114     HS Troponin T <6 ng/L     Narrative:      High Sensitive Troponin T Reference Range:  <10.0 ng/L- Negative Female for AMI  <15.0 ng/L- Negative Male for AMI  >=10 - Abnormal Female indicating possible myocardial injury.  >=15 - Abnormal Male indicating possible myocardial injury.   Clinicians would have to utilize clinical acumen, EKG, Troponin, and serial changes to determine if it is an Acute Myocardial Infarction or myocardial injury due to an underlying chronic condition.         CBC Auto Differential [[id number]]  (Abnormal) Collected: 04/01/23 1040    Specimen: Blood Updated: 04/01/23 1049     WBC 27.36 10*3/mm3      RBC 5.06 10*6/mm3      Hemoglobin 13.9 g/dL      Hematocrit 42.0 %      MCV 83.0 fL      MCH 27.5 pg      MCHC 33.1 g/dL      RDW 13.0 %      RDW-SD 39.0 fl      MPV 8.3 fL      Platelets 428 10*3/mm3      Neutrophil % 91.8 %      Lymphocyte % 3.2 %      Monocyte % 3.5 %      Eosinophil % 0.3 %      Basophil % 0.4 %      Immature Grans % 0.8 %      Neutrophils, Absolute 25.12 10*3/mm3      Lymphocytes, Absolute 0.88 10*3/mm3      Monocytes, Absolute 0.97 10*3/mm3      Eosinophils, Absolute 0.08 10*3/mm3      Basophils, Absolute 0.10 10*3/mm3      Immature Grans, Absolute 0.21 10*3/mm3      nRBC 0.0 /100 WBC     C-reactive Protein [[id number]]  (Abnormal) Collected: 04/01/23 1040    Specimen: Blood Updated: 04/01/23 1253     C-Reactive Protein 1.48 mg/dL     Sedimentation Rate [[id number]]  (Abnormal) Collected: 04/01/23 1040    Specimen: Blood Updated: 04/01/23 1256     Sed Rate 49 mm/hr     Influenza Antigen,  Rapid - Swab, Nasopharynx [[id number]]  (Normal) Collected: 04/01/23 1042    Specimen: Swab from Nasopharynx Updated: 04/01/23 1118     Influenza A Ag, EIA Negative     Influenza B Ag, EIA Negative    COVID-19,APTIMA PANTHER(GEOVANNY),[facility]/ [facility], NP/OP SWAB IN UTM/VTM/SALINE TRANSPORT MEDIA,24 HR TAT - Swab, Nasopharynx [[id number]]  (Normal) Collected: 04/01/23 1042    Specimen: Swab from Nasopharynx Updated: 04/01/23 1548     COVID19 Not Detected    Narrative:      Fact sheet for providers: https://www.fda.gov/media/415216/download     Fact sheet for patients: https://www.fda.gov/media/528456/download    Test performed by RT PCR.    Rapid Strep A Screen - Swab, Throat [[id number]]  (Normal) Collected: 04/01/23 1227    Specimen: Swab from Throat Updated: 04/01/23 1255     Strep A Ag Negative    Blood Culture - Blood, Arm, Left [[id number]] Collected: 04/01/23 1227    Specimen: Blood from Arm, Left Updated: 04/01/23 1231    Blood Culture - Blood, Arm, Right [[id number]] Collected: 04/01/23 1227    Specimen: Blood from Arm, Right Updated: 04/01/23 1231    Beta Strep Culture, Throat - Swab, Throat [[id number]] Collected: 04/01/23 1227    Specimen: Swab from Throat Updated: 04/01/23 1254    Lactic Acid, Plasma [[id number]]  (Normal) Collected: 04/01/23 1228    Specimen: Blood Updated: 04/01/23 1248     Lactate 1.3 mmol/L            Imaging:    MRI Hip Right With & Without Contrast    Result Date: 4/1/2023  PROCEDURE: MRI LUMBAR SPINE W WO CONTRAST, 4/01/2023, 14:07 MRI HIP RIGHT W WO CONTRAST, 4/01/2023, 14:07  COMPARISON: [facility], [place], XR HIP W OR WO PELVIS 2-3 VIEW RIGHT, 4/21/2022, 3:01.  [facility], [place], XR SPINE LUMBAR 2 OR 3 VW, 4/21/2022, 3:03.  INDICATIONS: low back pain, right lower extremity radiculopathy  CONTRAST: 10ml  Multihance I.V.  TECHNIQUE: Multiplanar images of the lumbar spine, pelvis, and right hip were obtained before and after administration of IV contrast.  FINDINGS:   Vertebral body heights and disc spaces are maintained.  No disc bulge or protrusion is seen.  The spinal canal and foramina are widely patent.  No abnormal signal is seen in the proximal femurs.  There are no findings of fracture, stress fracture, or avascular necrosis.  Abnormal T1 dark T2 bright signal is seen in the right sacral ala and in the adjacent portion of the right iliac bone.  Intense enhancement is seen following administration of IV contrast.  Abnormal signal and enhancement is seen in the sacroiliac joint.  Surrounding enhancement is seen in the periosteum and adjacent soft tissues.  Findings are consistent with infection.  No drainable fluid collection is evident.  The uterus measures 5.5 cm in transverse dimension.  No pelvic mass is seen.  CONTINUED ON NEXT PAGE...          MR examination of the lumbar spine, pelvis, and right hip demonstrating extensive abnormal signal and enhancement in the right sacral ala, right sacroiliac joint, and right ilium consistent with infection.  I discussed findings with Dr. Pan at 1600.      MEAGHAN MENDIOLA MD       Electronically Signed and Approved By: MEAGHAN MENDIOLA MD on 4/01/2023 at 16:11             XR Chest 1 View    Result Date: 4/1/2023  PROCEDURE: XR CHEST 1 VW  [facility] 20:34.  INDICATIONS: Dysrhythmia Triage Protocol  FINDINGS:  The heart is normal in size.  The lungs are well-expanded and free of infiltrates.  Bony structures appear intact.        No active disease is seen.       MEAGHAN MENDIOLA MD       Electronically Signed and Approved By: MEAGHAN MENDIOLA MD on 4/01/2023 at 11:02             MRI Lumbar Spine With & Without Contrast    Result Date: 4/1/2023  PROCEDURE: MRI LUMBAR SPINE W WO CONTRAST, 4/01/2023, 14:07 MRI HIP RIGHT W WO CONTRAST, 4/01/2023, 14:07  COMPARISON: [facility], [place], XR HIP W OR WO PELVIS 2-3 VIEW RIGHT, 4/21/2022, 3:01.  [facility], [place],  XR SPINE LUMBAR 2 OR 3 VW, 4/21/2022, 3:03.  INDICATIONS: low back pain, right lower extremity radiculopathy  CONTRAST: 10ml  Multihance I.V.  TECHNIQUE: Multiplanar images of the lumbar spine, pelvis, and right hip were obtained before and after administration of IV contrast.  FINDINGS:  Vertebral body heights and disc spaces are maintained.  No disc bulge or protrusion is seen.  The spinal canal and foramina are widely patent.  No abnormal signal is seen in the proximal femurs.  There are no findings of fracture, stress fracture, or avascular necrosis.  Abnormal T1 dark T2 bright signal is seen in the right sacral ala and in the adjacent portion of the right iliac bone.  Intense enhancement is seen following administration of IV contrast.  Abnormal signal and enhancement is seen in the sacroiliac joint.  Surrounding enhancement is seen in the periosteum and adjacent soft tissues.  Findings are consistent with infection.  No drainable fluid collection is evident.  The uterus measures 5.5 cm in transverse dimension.  No pelvic mass is seen.  CONTINUED ON NEXT PAGE...          MR examination of the lumbar spine, pelvis, and right hip demonstrating extensive abnormal signal and enhancement in the right sacral ala, right sacroiliac joint, and right ilium consistent with infection.  I discussed findings with Dr. Pan at 1600.      MEAGHAN MENDIOLA MD       Electronically Signed and Approved By: MEAGHAN MENDIOLA MD on 4/01/2023 at 16:11                 Differential Diagnosis and Discussion:    Generalized body aches  Cough: Differential diagnosis includes but is not limited to pneumonia, acute bronchitis, upper respiratory infection, ACE inhibitor use, allergic reaction, epiglottitis, seasonal allergies, chemical irritants, exercise-induced asthma, viral syndrome.  Joint Pain: Differential diagnosis includes but is not limited to polyarticular arthritis, gout, tendinitis, hemarthrosis, septic arthritis, rheumatoid  arthritis, bursitis, degenerative joint disease, joint effusion, autoimmune disorder, trauma, and occult neoplasm.    All labs were reviewed and interpreted by me.   MRI results/radiology interpretations were reviewed by me.    MDM         Patient Care Considerations:          Consultants/Shared Management Plan:    Case discussed with orthopedics on-call, Dr. Scott.  Feels this patient should be transferred to a tertiary care center that has infectious disease.    Case discussed with orthopedics on-call at [facility].  They feel patient is not an operative candidate but does likely need interventional radiology aspiration and infectious disease involvement    Case discussed with hospitalist at [facility], Dr. Cook.  He accepts transfer.        Social Determinants of Health:    Patient is independent, reliable, and has access to care.       Disposition and Care Coordination:    Transferred: Through independent evaluation of the patient's history, physical, and imperical data, the patient meets criteria to be transferred to another hospital for evaluation/admission.        Final diagnoses:   Sacroiliitis        ED Disposition     ED Disposition   Transfer to Another Facility     Condition   --    Comment   --             This medical record created using voice recognition software.     Documentation assistance provided by Rolf Thomas acting as scribe for Clark Pan MD. Information recorded by the scribe was done at my direction and has been verified and validated by me.          Rolf Thomas  04/01/23 1220       Rolf Thomas  04/01/23 1232       Gerardo Rodriguez  04/01/23 1234       Gerardo Rodriguez  04/01/23 0879       Clark Pan MD  04/01/23 [id number]

## 2023-04-03 LAB — BACTERIA SPEC AEROBE CULT: NORMAL

## 2023-04-06 LAB
BACTERIA SPEC AEROBE CULT: NORMAL
BACTERIA SPEC AEROBE CULT: NORMAL
QT INTERVAL: 319 MS

## 2023-04-17 ENCOUNTER — LAB REQUISITION (OUTPATIENT)
Dept: LAB | Facility: HOSPITAL | Age: 38
End: 2023-04-17
Payer: OTHER GOVERNMENT

## 2023-04-17 DIAGNOSIS — A41.9 SEPSIS, UNSPECIFIED ORGANISM: ICD-10-CM

## 2023-04-17 LAB
ALBUMIN SERPL-MCNC: 5 G/DL (ref 3.5–5.2)
ALBUMIN/GLOB SERPL: 1.6 G/DL
ALP SERPL-CCNC: 77 U/L (ref 39–117)
ALT SERPL W P-5'-P-CCNC: 15 U/L (ref 1–33)
ANION GAP SERPL CALCULATED.3IONS-SCNC: 13.4 MMOL/L (ref 5–15)
AST SERPL-CCNC: 12 U/L (ref 1–32)
BASOPHILS # BLD AUTO: 0.12 10*3/MM3 (ref 0–0.2)
BASOPHILS NFR BLD AUTO: 1.2 % (ref 0–1.5)
BILIRUB SERPL-MCNC: 0.3 MG/DL (ref 0–1.2)
BUN SERPL-MCNC: 17 MG/DL (ref 6–20)
BUN/CREAT SERPL: 26.6 (ref 7–25)
CALCIUM SPEC-SCNC: 9.4 MG/DL (ref 8.6–10.5)
CHLORIDE SERPL-SCNC: 97 MMOL/L (ref 98–107)
CK SERPL-CCNC: 21 U/L (ref 20–180)
CO2 SERPL-SCNC: 26.6 MMOL/L (ref 22–29)
CREAT SERPL-MCNC: 0.64 MG/DL (ref 0.57–1)
CRP SERPL-MCNC: <0.3 MG/DL (ref 0–0.5)
DEPRECATED RDW RBC AUTO: 42.3 FL (ref 37–54)
EGFRCR SERPLBLD CKD-EPI 2021: 117.6 ML/MIN/1.73
EOSINOPHIL # BLD AUTO: 0.57 10*3/MM3 (ref 0–0.4)
EOSINOPHIL NFR BLD AUTO: 5.8 % (ref 0.3–6.2)
ERYTHROCYTE [DISTWIDTH] IN BLOOD BY AUTOMATED COUNT: 14.1 % (ref 12.3–15.4)
GLOBULIN UR ELPH-MCNC: 3.2 GM/DL
GLUCOSE SERPL-MCNC: 68 MG/DL (ref 65–99)
HCT VFR BLD AUTO: 37.6 % (ref 34–46.6)
HGB BLD-MCNC: 12.4 G/DL (ref 12–15.9)
HOLD SPECIMEN: NORMAL
IMM GRANULOCYTES # BLD AUTO: 0.03 10*3/MM3 (ref 0–0.05)
IMM GRANULOCYTES NFR BLD AUTO: 0.3 % (ref 0–0.5)
LYMPHOCYTES # BLD AUTO: 2.55 10*3/MM3 (ref 0.7–3.1)
LYMPHOCYTES NFR BLD AUTO: 25.8 % (ref 19.6–45.3)
MCH RBC QN AUTO: 27.6 PG (ref 26.6–33)
MCHC RBC AUTO-ENTMCNC: 33 G/DL (ref 31.5–35.7)
MCV RBC AUTO: 83.6 FL (ref 79–97)
MONOCYTES # BLD AUTO: 0.85 10*3/MM3 (ref 0.1–0.9)
MONOCYTES NFR BLD AUTO: 8.6 % (ref 5–12)
NEUTROPHILS NFR BLD AUTO: 5.78 10*3/MM3 (ref 1.7–7)
NEUTROPHILS NFR BLD AUTO: 58.3 % (ref 42.7–76)
NRBC BLD AUTO-RTO: 0 /100 WBC (ref 0–0.2)
PLATELET # BLD AUTO: 487 10*3/MM3 (ref 140–450)
PMV BLD AUTO: 8.8 FL (ref 6–12)
POTASSIUM SERPL-SCNC: 4.7 MMOL/L (ref 3.5–5.2)
PROT SERPL-MCNC: 8.2 G/DL (ref 6–8.5)
RBC # BLD AUTO: 4.5 10*6/MM3 (ref 3.77–5.28)
SODIUM SERPL-SCNC: 137 MMOL/L (ref 136–145)
WBC NRBC COR # BLD: 9.9 10*3/MM3 (ref 3.4–10.8)

## 2023-04-17 PROCEDURE — 86140 C-REACTIVE PROTEIN: CPT | Performed by: INTERNAL MEDICINE

## 2023-04-17 PROCEDURE — 85025 COMPLETE CBC W/AUTO DIFF WBC: CPT | Performed by: INTERNAL MEDICINE

## 2023-04-17 PROCEDURE — 82550 ASSAY OF CK (CPK): CPT | Performed by: INTERNAL MEDICINE

## 2023-04-17 PROCEDURE — 80053 COMPREHEN METABOLIC PANEL: CPT | Performed by: INTERNAL MEDICINE

## 2023-04-22 ENCOUNTER — APPOINTMENT (OUTPATIENT)
Dept: GENERAL RADIOLOGY | Facility: HOSPITAL | Age: 38
End: 2023-04-22
Payer: OTHER GOVERNMENT

## 2023-04-22 ENCOUNTER — HOSPITAL ENCOUNTER (EMERGENCY)
Facility: HOSPITAL | Age: 38
Discharge: HOME | End: 2023-04-22
Attending: EMERGENCY MEDICINE
Payer: OTHER GOVERNMENT

## 2023-04-22 VITALS
BODY MASS INDEX: 18.83 KG/M2 | SYSTOLIC BLOOD PRESSURE: 102 MMHG | WEIGHT: 120 LBS | RESPIRATION RATE: 20 BRPM | TEMPERATURE: 98.5 F | OXYGEN SATURATION: 93 % | DIASTOLIC BLOOD PRESSURE: 74 MMHG | HEIGHT: 67 IN | HEART RATE: 95 BPM

## 2023-04-22 DIAGNOSIS — B34.9 ACUTE VIRAL SYNDROME: Primary | ICD-10-CM

## 2023-04-22 LAB
ALBUMIN SERPL-MCNC: 4.5 G/DL (ref 3.5–5.2)
ALBUMIN/GLOB SERPL: 1.5 G/DL
ALP SERPL-CCNC: 74 U/L (ref 39–117)
ALT SERPL W P-5'-P-CCNC: 15 U/L (ref 1–33)
ANION GAP SERPL CALCULATED.3IONS-SCNC: 17 MMOL/L (ref 5–15)
AST SERPL-CCNC: 15 U/L (ref 1–32)
BASOPHILS # BLD AUTO: 0.09 10*3/MM3 (ref 0–0.2)
BASOPHILS NFR BLD AUTO: 0.3 % (ref 0–1.5)
BILIRUB SERPL-MCNC: 0.3 MG/DL (ref 0–1.2)
BILIRUB UR QL STRIP: NEGATIVE
BUN SERPL-MCNC: 15 MG/DL (ref 6–20)
BUN/CREAT SERPL: 21.4 (ref 7–25)
CALCIUM SPEC-SCNC: 9.6 MG/DL (ref 8.6–10.5)
CHLORIDE SERPL-SCNC: 97 MMOL/L (ref 98–107)
CLARITY UR: CLEAR
CO2 SERPL-SCNC: 22 MMOL/L (ref 22–29)
COLOR UR: YELLOW
CREAT SERPL-MCNC: 0.7 MG/DL (ref 0.57–1)
D-LACTATE SERPL-SCNC: 2 MMOL/L (ref 0.5–2)
D-LACTATE SERPL-SCNC: 2.1 MMOL/L (ref 0.5–2)
DEPRECATED RDW RBC AUTO: 42.4 FL (ref 37–54)
EGFRCR SERPLBLD CKD-EPI 2021: 115.1 ML/MIN/1.73
EOSINOPHIL # BLD AUTO: 0.34 10*3/MM3 (ref 0–0.4)
EOSINOPHIL NFR BLD AUTO: 1.2 % (ref 0.3–6.2)
ERYTHROCYTE [DISTWIDTH] IN BLOOD BY AUTOMATED COUNT: 14.6 % (ref 12.3–15.4)
FLUAV AG NPH QL: NEGATIVE
FLUBV AG NPH QL IA: NEGATIVE
GLOBULIN UR ELPH-MCNC: 3 GM/DL
GLUCOSE SERPL-MCNC: 115 MG/DL (ref 65–99)
GLUCOSE UR STRIP-MCNC: NEGATIVE MG/DL
HCG INTACT+B SERPL-ACNC: <0.5 MIU/ML
HCT VFR BLD AUTO: 34.3 % (ref 34–46.6)
HGB BLD-MCNC: 11.7 G/DL (ref 12–15.9)
HGB UR QL STRIP.AUTO: NEGATIVE
HOLD SPECIMEN: NORMAL
HOLD SPECIMEN: NORMAL
IMM GRANULOCYTES # BLD AUTO: 0.14 10*3/MM3 (ref 0–0.05)
IMM GRANULOCYTES NFR BLD AUTO: 0.5 % (ref 0–0.5)
KETONES UR QL STRIP: NEGATIVE
LEUKOCYTE ESTERASE UR QL STRIP.AUTO: NEGATIVE
LIPASE SERPL-CCNC: 23 U/L (ref 13–60)
LYMPHOCYTES # BLD AUTO: 1.3 10*3/MM3 (ref 0.7–3.1)
LYMPHOCYTES NFR BLD AUTO: 4.6 % (ref 19.6–45.3)
MCH RBC QN AUTO: 28.2 PG (ref 26.6–33)
MCHC RBC AUTO-ENTMCNC: 34.1 G/DL (ref 31.5–35.7)
MCV RBC AUTO: 82.7 FL (ref 79–97)
MONOCYTES # BLD AUTO: 1.29 10*3/MM3 (ref 0.1–0.9)
MONOCYTES NFR BLD AUTO: 4.6 % (ref 5–12)
NEUTROPHILS NFR BLD AUTO: 25.16 10*3/MM3 (ref 1.7–7)
NEUTROPHILS NFR BLD AUTO: 88.8 % (ref 42.7–76)
NITRITE UR QL STRIP: NEGATIVE
NRBC BLD AUTO-RTO: 0 /100 WBC (ref 0–0.2)
PH UR STRIP.AUTO: 7 [PH] (ref 5–8)
PLATELET # BLD AUTO: 352 10*3/MM3 (ref 140–450)
PMV BLD AUTO: 8.5 FL (ref 6–12)
POTASSIUM SERPL-SCNC: 4 MMOL/L (ref 3.5–5.2)
PROT SERPL-MCNC: 7.5 G/DL (ref 6–8.5)
PROT UR QL STRIP: NEGATIVE
RBC # BLD AUTO: 4.15 10*6/MM3 (ref 3.77–5.28)
SODIUM SERPL-SCNC: 136 MMOL/L (ref 136–145)
SP GR UR STRIP: 1.01 (ref 1–1.03)
UROBILINOGEN UR QL STRIP: NORMAL
WBC NRBC COR # BLD: 28.32 10*3/MM3 (ref 3.4–10.8)
WHOLE BLOOD HOLD COAG: NORMAL
WHOLE BLOOD HOLD SPECIMEN: NORMAL

## 2023-04-22 PROCEDURE — 87040 BLOOD CULTURE FOR BACTERIA: CPT | Performed by: EMERGENCY MEDICINE

## 2023-04-22 PROCEDURE — 87804 INFLUENZA ASSAY W/OPTIC: CPT

## 2023-04-22 PROCEDURE — 84702 CHORIONIC GONADOTROPIN TEST: CPT

## 2023-04-22 PROCEDURE — 83605 ASSAY OF LACTIC ACID: CPT | Performed by: EMERGENCY MEDICINE

## 2023-04-22 PROCEDURE — 36415 COLL VENOUS BLD VENIPUNCTURE: CPT

## 2023-04-22 PROCEDURE — 80053 COMPREHEN METABOLIC PANEL: CPT

## 2023-04-22 PROCEDURE — 99283 EMERGENCY DEPT VISIT LOW MDM: CPT

## 2023-04-22 PROCEDURE — 81003 URINALYSIS AUTO W/O SCOPE: CPT

## 2023-04-22 PROCEDURE — 96360 HYDRATION IV INFUSION INIT: CPT

## 2023-04-22 PROCEDURE — 85025 COMPLETE CBC W/AUTO DIFF WBC: CPT

## 2023-04-22 PROCEDURE — 83690 ASSAY OF LIPASE: CPT

## 2023-04-22 PROCEDURE — 71045 X-RAY EXAM CHEST 1 VIEW: CPT

## 2023-04-22 RX ORDER — ONDANSETRON 8 MG/1
8 TABLET, ORALLY DISINTEGRATING ORAL EVERY 8 HOURS PRN
Qty: 20 TABLET | Refills: 0 | Status: SHIPPED | OUTPATIENT
Start: 2023-04-22

## 2023-04-22 RX ORDER — SODIUM CHLORIDE 0.9 % (FLUSH) 0.9 %
10 SYRINGE (ML) INJECTION AS NEEDED
Status: DISCONTINUED | OUTPATIENT
Start: 2023-04-22 | End: 2023-04-22 | Stop reason: HOSPADM

## 2023-04-22 RX ADMIN — SODIUM CHLORIDE, POTASSIUM CHLORIDE, SODIUM LACTATE AND CALCIUM CHLORIDE 1632 ML: 600; 310; 30; 20 INJECTION, SOLUTION INTRAVENOUS at 06:59

## 2023-04-22 NOTE — DISCHARGE INSTRUCTIONS
Rest at home.  Drink plenty of fluids.  Tylenol and/or Motrin as needed for fevers or body aches.  Continue current home IV antibiotic therapy.  Contact your infectious disease specialist on Monday and notify him of your illness and current condition.  Return to the ER for any change or worsening concerns or any other issues that may arise.

## 2023-04-22 NOTE — ED PROVIDER NOTES
Time: 6:22 AM EDT  Date of encounter:  4/22/2023  Independent Historian/Clinical History and Information was obtained by:   Patient  Chief Complaint: Nausea and body aches    History is limited by: N/A    History of Present Illness:  Patient is a 37 y.o. year old female who presents to the emergency department for evaluation of nausea, body aches and headache.  Pt reports she has exposure to a sick contact stating her 10-month-old son was diagnosed with influenza type B yesterday. Pt reports nausea and body aches starting last night.  Patient states that her  is now reporting similar symptoms at home.    Pt has a recent history of Sacroiliitis.  Patient was admitted at [facility] and is currently being followed by an infectious disease specialist, Dr. Vasquez.  Patient is currently taking both IV and oral antibiotics.   HPI    Patient Care Team  Primary Care Provider: Bunny Galvan MD    Past Medical History:     Allergies   Allergen Reactions   • Bactrim [Sulfamethoxazole-Trimethoprim] Hives   • Hazelnut (Filbert) Allergy Skin Test Hives   • Penicillins Hives   • Shellfish-Derived Products Hives   • Sulfa Antibiotics Hives   • Daptomycin Rash   • Vancomycin Rash     Past Medical History:   Diagnosis Date   • PAC (premature atrial contraction)      Past Surgical History:   Procedure Laterality Date   • EAR TUBES     • MOLE REMOVAL       Family History   Problem Relation Age of Onset   • Hypertension Father    • Stroke Father    • Hypertension Mother    • Breast cancer Paternal Grandmother    • Lung cancer Paternal Grandfather        Home Medications:  Prior to Admission medications    Medication Sig Start Date End Date Taking? Authorizing Provider   famotidine (PEPCID) 20 MG tablet Take 1 tablet by mouth Daily. 8/29/22   Thu Solorio APRN   ibuprofen (ADVIL,MOTRIN) 600 MG tablet Take 1 tablet by mouth Every 6 (Six) Hours As Needed for Mild Pain . 4/13/22   Alejandro Crews MD   Prenatal  "Vit-Fe Fumarate-FA (prenatal vitamin 27-0.8) 27-0.8 MG tablet tablet Take 1 tablet by mouth Daily.    Provider, Historical, MD        Social History:   Social History     Tobacco Use   • Smoking status: Never   • Smokeless tobacco: Never   Substance Use Topics   • Alcohol use: Never   • Drug use: Never         Review of Systems:  Review of Systems   Constitutional: Negative for chills and fever.   HENT: Negative for congestion, ear pain and sore throat.    Eyes: Negative for pain.   Respiratory: Negative for cough, chest tightness and shortness of breath.    Cardiovascular: Negative for chest pain.   Gastrointestinal: Positive for nausea. Negative for abdominal pain, diarrhea and vomiting.   Genitourinary: Negative for flank pain and hematuria.   Musculoskeletal: Positive for myalgias. Negative for joint swelling.   Skin: Negative for pallor.   Neurological: Positive for headaches. Negative for seizures.   All other systems reviewed and are negative.           Physical Exam:  /74   Pulse 95   Temp 98.5 °F (36.9 °C) (Oral)   Resp 20   Ht 170.2 cm (67\")   Wt 54.4 kg (120 lb)   SpO2 93%   Breastfeeding No   BMI 18.79 kg/m²     Vital signs were reviewed under triage note.  General appearance - Patient appears well-developed and well-nourished.  Patient is in no acute distress.  Patient is nontoxic-appearing  Head - Normocephalic, atraumatic.  Pupils - Equal, round, reactive to light.  Extraocular muscles are intact.  Conjunctive is clear.  Nasal - Normal inspection.  No evidence of trauma or epistaxis.  Tympanic membranes - Gray, intact without erythema or retractions.  Oral mucosa - Pink and moist without lesions or erythema.  Uvula is midline.  Chest wall - Atraumatic.  Chest wall is nontender.  There is no vesicular rashes noted.  Neck - Supple.  Trachea was midline.  There is no palpable lymphadenopathy or thyromegaly.  There are no meningeal signs  Lungs - Clear to auscultation and percussion " bilaterally.  Heart - Tachycardic rate but with a regular rhythm without any murmurs, clicks, or gallops.  Abdomen - Soft.  Bowel sounds are present.  There is no palpable tenderness.  There is no rebound, guarding, or rigidity.  There are no palpable masses.  There are no pulsatile masses.  Back - Spine is straight and midline.  There is no CVA tenderness.  Extremities - Intact x4 with full range of motion.  There is no palpable edema.  Pulses are intact x4 and equal.  Patient has a PICC line in her right humeral region.  There is no surrounding erythema, swelling, or redness.  Neurologic - Patient is awake, alert, and oriented x3.  Cranial nerves II through XII are grossly intact.  Motor and sensory functions grossly intact.  Cerebellar function was normal.  Integument - There are no rashes.  There are no petechia or purpura lesions noted.  There are no vesicular lesions noted.                 Procedures:  Procedures      Medical Decision Making:      Comorbidities that affect care:    Sacroiliitis    External Notes reviewed:    Previous Clinic Note: Progress notes from the [facility] was reviewed during her most recent hospitalization on 4/11/2023 and 4/12/2023.      The following orders were placed and all results were independently analyzed by me:  Orders Placed This Encounter   Procedures   • Influenza Antigen, Rapid - Swab, Nasopharynx   • Blood Culture - Blood,   • Blood Culture - Blood,   • XR Chest 1 View   • [place] Draw   • Comprehensive Metabolic Panel   • Lipase   • Urinalysis With Microscopic If Indicated (No Culture) - Urine, Clean Catch   • hCG, Quantitative, Pregnancy   • CBC Auto Differential   • Lactic Acid, Plasma   • STAT Lactic Acid, Reflex   • Undress & Gown   • CBC & Differential   • Green Top (Gel)   • Lavender Top   • Gold Top - SST   • Light Blue Top       Medications Given in the Emergency Department:  Medications   lactated ringers bolus 1,632 mL (0 mL Intravenous Stopped  4/22/23 0759)        ED Course:     The patient was seen and evaluated the ED by me.  The above history and physical examination was performed as document.  Diagnostic data was obtained.  Results reviewed.  Patient's influenza swab came back negative however feel this may be a false negative as she may be too early in her infectious course to turn this test positive.  Patient does not have the same back pain that she had with her sacroiliitis infection.  I did attempt to get a hold of Dr. Vasquez however there was an on-call infectious disease specialist.  He did not provide any additional help or advice.  Subsequently, I offered to transfer the patient to [facility] to be admitted so she she would be seen by infectious disease.  Patient opted at this time to go home continue current antibiotics and to call Dr. Vasquez on Monday.  Patient was advised to return to the ER at any point time for repeat evaluation.  Blood cultures are pending.  I also gave the patient my work schedule for the next 48 hours to return if needed and I be happy to care for her once again.  The patient was agreeable to this.        Labs:    Lab Results (last 24 hours)     Procedure Component Value Units Date/Time    CBC & Differential [[id number]]  (Abnormal) Collected: 04/22/23 0021    Specimen: Blood Updated: 04/22/23 0033    Narrative:      The following orders were created for panel order CBC & Differential.  Procedure                               Abnormality         Status                     ---------                               -----------         ------                     CBC Auto Differential[[id number]]        Abnormal            Final result                 Please view results for these tests on the individual orders.    Comprehensive Metabolic Panel [[id number]]  (Abnormal) Collected: 04/22/23 0021    Specimen: Blood Updated: 04/22/23 0055     Glucose 115 mg/dL      BUN 15 mg/dL      Creatinine 0.70 mg/dL      Sodium 136  mmol/L      Potassium 4.0 mmol/L      Comment: Slight hemolysis detected by analyzer. Results may be affected.        Chloride 97 mmol/L      CO2 22.0 mmol/L      Calcium 9.6 mg/dL      Total Protein 7.5 g/dL      Albumin 4.5 g/dL      ALT (SGPT) 15 U/L      AST (SGOT) 15 U/L      Alkaline Phosphatase 74 U/L      Total Bilirubin 0.3 mg/dL      Globulin 3.0 gm/dL      A/G Ratio 1.5 g/dL      BUN/Creatinine Ratio 21.4     Anion Gap 17.0 mmol/L      eGFR 115.1 mL/min/1.73     Narrative:      GFR Normal >60  Chronic Kidney Disease <60  Kidney Failure <15      Lipase [[id number]]  (Normal) Collected: 04/22/23 0021    Specimen: Blood Updated: 04/22/23 0055     Lipase 23 U/L     hCG, Quantitative, Pregnancy [[id number]] Collected: 04/22/23 0021    Specimen: Blood Updated: 04/22/23 0053     HCG Quantitative <0.50 mIU/mL     Narrative:      HCG Ranges by Gestational Age    Females - non-pregnant premenopausal   </= 1mIU/mL HCG  Females - postmenopausal               </= 7mIU/mL HCG    3 Weeks       5.4   -      72 mIU/mL  4 Weeks      10.2   -     708 mIU/mL  5 Weeks       217   -   8,245 mIU/mL  6 Weeks       152   -  32,177 mIU/mL  7 Weeks     4,059   - 153,767 mIU/mL  8 Weeks    31,366   - 149,094 mIU/mL  9 Weeks    59,109   - 135,901 mIU/mL  10 Weeks   44,186   - 170,409 mIU/mL  12 Weeks   27,107   - 201,615 mIU/mL  14 Weeks   24,302   -  93,646 mIU/mL  15 Weeks   12,540   -  69,747 mIU/mL  16 Weeks    8,904   -  55,332 mIU/mL  17 Weeks    8,240   -  51,793 mIU/mL  18 Weeks    9,649   -  55,271 mIU/mL      CBC Auto Differential [[id number]]  (Abnormal) Collected: 04/22/23 0021    Specimen: Blood Updated: 04/22/23 0033     WBC 28.32 10*3/mm3      RBC 4.15 10*6/mm3      Hemoglobin 11.7 g/dL      Hematocrit 34.3 %      MCV 82.7 fL      MCH 28.2 pg      MCHC 34.1 g/dL      RDW 14.6 %      RDW-SD 42.4 fl      MPV 8.5 fL      Platelets 352 10*3/mm3      Neutrophil % 88.8 %      Lymphocyte % 4.6 %      Monocyte % 4.6 %       Eosinophil % 1.2 %      Basophil % 0.3 %      Immature Grans % 0.5 %      Neutrophils, Absolute 25.16 10*3/mm3      Lymphocytes, Absolute 1.30 10*3/mm3      Monocytes, Absolute 1.29 10*3/mm3      Eosinophils, Absolute 0.34 10*3/mm3      Basophils, Absolute 0.09 10*3/mm3      Immature Grans, Absolute 0.14 10*3/mm3      nRBC 0.0 /100 WBC     Influenza Antigen, Rapid - Swab, Nasopharynx [[id number]]  (Normal) Collected: 04/22/23 0029    Specimen: Swab from Nasopharynx Updated: 04/22/23 0057     Influenza A Ag, EIA Negative     Influenza B Ag, EIA Negative    Urinalysis With Microscopic If Indicated (No Culture) - Urine, Clean Catch [[id number]]  (Normal) Collected: 04/22/23 0337    Specimen: Urine, Clean Catch Updated: 04/22/23 0341     Color, UA Yellow     Appearance, UA Clear     pH, UA 7.0     Specific Gravity, UA 1.010     Glucose, UA Negative     Ketones, UA Negative     Bilirubin, UA Negative     Blood, UA Negative     Protein, UA Negative     Leuk Esterase, UA Negative     Nitrite, UA Negative     Urobilinogen, UA 0.2 E.U./dL    Narrative:      Urine microscopic not indicated.    Blood Culture - Blood, Arm, Left [[id number]] Collected: 04/22/23 0514    Specimen: Blood from Arm, Left Updated: 04/22/23 0518    Blood Culture - Blood, Arm, Right [[id number]] Collected: 04/22/23 0514    Specimen: Blood from Arm, Right Updated: 04/22/23 0518    Lactic Acid, Plasma [[id number]]  (Abnormal) Collected: 04/22/23 0514    Specimen: Blood Updated: 04/22/23 0604     Lactate 2.1 mmol/L     STAT Lactic Acid, Reflex [[id number]]  (Normal) Collected: 04/22/23 0932    Specimen: Blood Updated: 04/22/23 1023     Lactate 2.0 mmol/L            Imaging:    XR Chest 1 View    Result Date: 4/22/2023  PROCEDURE: XR CHEST 1 VW  COMPARISON: [facility], CR, XR CHEST 1 VW, 4/01/2023, 10:51.  INDICATIONS: PICC verification. Flu symptoms  FINDINGS:  No new consolidations or pleural effusions are observed.  Breast tissue overlies the  lower lobes bilaterally.  The cardiac silhouette and mediastinum are unchanged. A right-sided PICC line is noted with the distal tip seen near the SVC/right atrial junction in good position. There is no evidence for pneumothorax. No definitive acute osseous abnormalities are seen on this single view.        1. The right-sided PICC line is observed with the distal tip seen near the SVC right atrial junction in good position. The line is ready for use. 2. Otherwise no change from the previous study with no evidence for acute cardiopulmonary process.       AMPARO WILDER MD       Electronically Signed and Approved By: AMPARO WILDER MD on 4/22/2023 at 0:46                 Differential Diagnosis and Discussion:    Metabolic: Differential diagnosis includes but is not limited to hypertension, hyperglycemia, hyperkalemia, hypocalcemia, metabolic acidosis, hypokalemia, hypoglycemia, malnutrition, hypothyroidism, hyperthyroidism, and adrenal insufficiency.     All labs were reviewed and interpreted by me.  All X-rays impressions were independently interpreted by me.    MDM  Number of Diagnoses or Management Options  Diagnosis management comments: 11:14 EDT Updated patient on results and plan. Patient expressed understanding and agreement. All questions answered at this time.          Amount and/or Complexity of Data Reviewed  Decide to obtain previous medical records or to obtain history from someone other than the patient: yes             Patient Care Considerations:    ANTIBIOTICS: I considered prescribing antibiotics as an outpatient however Patient is currently on IV and p.o. antibiotics from her recent hospitalization under the care of Dr. Vasquez, infectious disease.      Consultants/Shared Management Plan:    Consultant: I have discussed the case with On-call infectious disease at [facility] who states The patient to become sent up to [facility] and he will see the patient determine any change in her  treatment.  Otherwise he cannot provide any other advice or recommendations.    Social Determinants of Health:    Patient is independent, reliable, and has access to care.       Disposition and Care Coordination:    Discharged: I considered escalation of care by admitting this patient for observation, however the patient has improved and is suitable and  stable for discharge.    I have explained the patient´s condition, diagnoses and treatment plan based on the information available to me at this time. I have answered questions and addressed any concerns. The patient has a good  understanding of the patient´s diagnosis, condition, and treatment plan as can be expected at this point. The vital signs have been stable. The patient´s condition is stable and appropriate for discharge from the emergency department.      The patient will pursue further outpatient evaluation with the primary care physician or other designated or consulting physician as outlined in the discharge instructions. They are agreeable to this plan of care and follow-up instructions have been explained in detail. The patient has received these instructions in written format and have expressed an understanding of the discharge instructions. The patient is aware that any significant change in condition or worsening of symptoms should prompt an immediate return to this or the closest emergency department or call to 911.    Final diagnoses:   Acute viral syndrome        ED Disposition     ED Disposition   Discharge    Condition   Stable    Comment   --             This medical record created using voice recognition software.        Documentation assistance provided by Alexander Gil acting as scribe for No att. providers found. Information recorded by the scribe was done at my direction and has been verified and validated by me.          Alexander Gil  04/22/23 0653       Alexander Gil  04/22/23 1114       Omer Pearson DO  04/22/23 6545

## 2023-04-24 ENCOUNTER — LAB REQUISITION (OUTPATIENT)
Dept: LAB | Facility: HOSPITAL | Age: 38
End: 2023-04-24
Payer: OTHER GOVERNMENT

## 2023-04-24 DIAGNOSIS — A41.9 SEPSIS, UNSPECIFIED ORGANISM: ICD-10-CM

## 2023-04-24 LAB
ALBUMIN SERPL-MCNC: 4.5 G/DL (ref 3.5–5.2)
ALBUMIN/GLOB SERPL: 1.6 G/DL
ALP SERPL-CCNC: 77 U/L (ref 39–117)
ALT SERPL W P-5'-P-CCNC: 13 U/L (ref 1–33)
ANION GAP SERPL CALCULATED.3IONS-SCNC: 13 MMOL/L (ref 5–15)
AST SERPL-CCNC: 11 U/L (ref 1–32)
BASOPHILS # BLD AUTO: 0.05 10*3/MM3 (ref 0–0.2)
BASOPHILS NFR BLD AUTO: 0.6 % (ref 0–1.5)
BILIRUB SERPL-MCNC: 0.5 MG/DL (ref 0–1.2)
BUN SERPL-MCNC: 9 MG/DL (ref 6–20)
BUN/CREAT SERPL: 15.3 (ref 7–25)
CALCIUM SPEC-SCNC: 9.4 MG/DL (ref 8.6–10.5)
CHLORIDE SERPL-SCNC: 100 MMOL/L (ref 98–107)
CK SERPL-CCNC: 23 U/L (ref 20–180)
CO2 SERPL-SCNC: 26 MMOL/L (ref 22–29)
CREAT SERPL-MCNC: 0.59 MG/DL (ref 0.57–1)
CRP SERPL-MCNC: 3.73 MG/DL (ref 0–0.5)
DEPRECATED RDW RBC AUTO: 45.2 FL (ref 37–54)
EGFRCR SERPLBLD CKD-EPI 2021: 120 ML/MIN/1.73
EOSINOPHIL # BLD AUTO: 0.37 10*3/MM3 (ref 0–0.4)
EOSINOPHIL NFR BLD AUTO: 4.2 % (ref 0.3–6.2)
ERYTHROCYTE [DISTWIDTH] IN BLOOD BY AUTOMATED COUNT: 15 % (ref 12.3–15.4)
GLOBULIN UR ELPH-MCNC: 2.9 GM/DL
GLUCOSE SERPL-MCNC: 116 MG/DL (ref 65–99)
HCT VFR BLD AUTO: 34.3 % (ref 34–46.6)
HGB BLD-MCNC: 11.3 G/DL (ref 12–15.9)
IMM GRANULOCYTES # BLD AUTO: 0.02 10*3/MM3 (ref 0–0.05)
IMM GRANULOCYTES NFR BLD AUTO: 0.2 % (ref 0–0.5)
LYMPHOCYTES # BLD AUTO: 1.14 10*3/MM3 (ref 0.7–3.1)
LYMPHOCYTES NFR BLD AUTO: 12.9 % (ref 19.6–45.3)
MCH RBC QN AUTO: 28 PG (ref 26.6–33)
MCHC RBC AUTO-ENTMCNC: 32.9 G/DL (ref 31.5–35.7)
MCV RBC AUTO: 84.9 FL (ref 79–97)
MONOCYTES # BLD AUTO: 0.68 10*3/MM3 (ref 0.1–0.9)
MONOCYTES NFR BLD AUTO: 7.7 % (ref 5–12)
NEUTROPHILS NFR BLD AUTO: 6.61 10*3/MM3 (ref 1.7–7)
NEUTROPHILS NFR BLD AUTO: 74.4 % (ref 42.7–76)
NRBC BLD AUTO-RTO: 0 /100 WBC (ref 0–0.2)
PLATELET # BLD AUTO: 303 10*3/MM3 (ref 140–450)
PMV BLD AUTO: 8.7 FL (ref 6–12)
POTASSIUM SERPL-SCNC: 4 MMOL/L (ref 3.5–5.2)
PROT SERPL-MCNC: 7.4 G/DL (ref 6–8.5)
RBC # BLD AUTO: 4.04 10*6/MM3 (ref 3.77–5.28)
SODIUM SERPL-SCNC: 139 MMOL/L (ref 136–145)
WBC NRBC COR # BLD: 8.87 10*3/MM3 (ref 3.4–10.8)

## 2023-04-24 PROCEDURE — 82550 ASSAY OF CK (CPK): CPT | Performed by: INTERNAL MEDICINE

## 2023-04-24 PROCEDURE — 80053 COMPREHEN METABOLIC PANEL: CPT | Performed by: INTERNAL MEDICINE

## 2023-04-24 PROCEDURE — 85025 COMPLETE CBC W/AUTO DIFF WBC: CPT | Performed by: INTERNAL MEDICINE

## 2023-04-24 PROCEDURE — 86140 C-REACTIVE PROTEIN: CPT | Performed by: INTERNAL MEDICINE

## 2023-04-27 LAB
BACTERIA SPEC AEROBE CULT: NORMAL
BACTERIA SPEC AEROBE CULT: NORMAL

## 2023-05-01 ENCOUNTER — LAB REQUISITION (OUTPATIENT)
Dept: LAB | Facility: HOSPITAL | Age: 38
End: 2023-05-01
Payer: OTHER GOVERNMENT

## 2023-05-01 DIAGNOSIS — A41.9 SEPSIS, UNSPECIFIED ORGANISM: ICD-10-CM

## 2023-05-01 LAB
ALBUMIN SERPL-MCNC: 4.5 G/DL (ref 3.5–5.2)
ALBUMIN/GLOB SERPL: 1.6 G/DL
ALP SERPL-CCNC: 65 U/L (ref 39–117)
ALT SERPL W P-5'-P-CCNC: 14 U/L (ref 1–33)
ANION GAP SERPL CALCULATED.3IONS-SCNC: 10.5 MMOL/L (ref 5–15)
AST SERPL-CCNC: 13 U/L (ref 1–32)
BASOPHILS # BLD AUTO: 0.04 10*3/MM3 (ref 0–0.2)
BASOPHILS NFR BLD AUTO: 0.9 % (ref 0–1.5)
BILIRUB SERPL-MCNC: 0.4 MG/DL (ref 0–1.2)
BUN SERPL-MCNC: 12 MG/DL (ref 6–20)
BUN/CREAT SERPL: 21.4 (ref 7–25)
CALCIUM SPEC-SCNC: 9.4 MG/DL (ref 8.6–10.5)
CHLORIDE SERPL-SCNC: 99 MMOL/L (ref 98–107)
CK SERPL-CCNC: 19 U/L (ref 20–180)
CO2 SERPL-SCNC: 26.5 MMOL/L (ref 22–29)
CREAT SERPL-MCNC: 0.56 MG/DL (ref 0.57–1)
CRP SERPL-MCNC: 0.45 MG/DL (ref 0–0.5)
DEPRECATED RDW RBC AUTO: 45.9 FL (ref 37–54)
EGFRCR SERPLBLD CKD-EPI 2021: 121.5 ML/MIN/1.73
EOSINOPHIL # BLD AUTO: 0.38 10*3/MM3 (ref 0–0.4)
EOSINOPHIL NFR BLD AUTO: 8.1 % (ref 0.3–6.2)
ERYTHROCYTE [DISTWIDTH] IN BLOOD BY AUTOMATED COUNT: 15.6 % (ref 12.3–15.4)
GLOBULIN UR ELPH-MCNC: 2.9 GM/DL
GLUCOSE SERPL-MCNC: 120 MG/DL (ref 65–99)
HCT VFR BLD AUTO: 31.6 % (ref 34–46.6)
HGB BLD-MCNC: 10.6 G/DL (ref 12–15.9)
IMM GRANULOCYTES # BLD AUTO: 0.01 10*3/MM3 (ref 0–0.05)
IMM GRANULOCYTES NFR BLD AUTO: 0.2 % (ref 0–0.5)
LYMPHOCYTES # BLD AUTO: 0.96 10*3/MM3 (ref 0.7–3.1)
LYMPHOCYTES NFR BLD AUTO: 20.6 % (ref 19.6–45.3)
MCH RBC QN AUTO: 28.7 PG (ref 26.6–33)
MCHC RBC AUTO-ENTMCNC: 33.5 G/DL (ref 31.5–35.7)
MCV RBC AUTO: 85.6 FL (ref 79–97)
MONOCYTES # BLD AUTO: 0.42 10*3/MM3 (ref 0.1–0.9)
MONOCYTES NFR BLD AUTO: 9 % (ref 5–12)
NEUTROPHILS NFR BLD AUTO: 2.86 10*3/MM3 (ref 1.7–7)
NEUTROPHILS NFR BLD AUTO: 61.2 % (ref 42.7–76)
NRBC BLD AUTO-RTO: 0 /100 WBC (ref 0–0.2)
PLATELET # BLD AUTO: 201 10*3/MM3 (ref 140–450)
PMV BLD AUTO: 8.5 FL (ref 6–12)
POTASSIUM SERPL-SCNC: 4 MMOL/L (ref 3.5–5.2)
PROT SERPL-MCNC: 7.4 G/DL (ref 6–8.5)
RBC # BLD AUTO: 3.69 10*6/MM3 (ref 3.77–5.28)
SODIUM SERPL-SCNC: 136 MMOL/L (ref 136–145)
WBC NRBC COR # BLD: 4.67 10*3/MM3 (ref 3.4–10.8)

## 2023-05-01 PROCEDURE — 80053 COMPREHEN METABOLIC PANEL: CPT | Performed by: INTERNAL MEDICINE

## 2023-05-01 PROCEDURE — 86140 C-REACTIVE PROTEIN: CPT | Performed by: INTERNAL MEDICINE

## 2023-05-01 PROCEDURE — 85025 COMPLETE CBC W/AUTO DIFF WBC: CPT | Performed by: INTERNAL MEDICINE

## 2023-05-01 PROCEDURE — 82550 ASSAY OF CK (CPK): CPT | Performed by: INTERNAL MEDICINE

## 2023-05-08 ENCOUNTER — LAB REQUISITION (OUTPATIENT)
Dept: LAB | Facility: HOSPITAL | Age: 38
End: 2023-05-08
Payer: OTHER GOVERNMENT

## 2023-05-08 DIAGNOSIS — A41.9 SEPSIS, UNSPECIFIED ORGANISM: ICD-10-CM

## 2023-05-08 LAB
ALBUMIN SERPL-MCNC: 4.5 G/DL (ref 3.5–5.2)
ALBUMIN/GLOB SERPL: 1.6 G/DL
ALP SERPL-CCNC: 66 U/L (ref 39–117)
ALT SERPL W P-5'-P-CCNC: 13 U/L (ref 1–33)
ANION GAP SERPL CALCULATED.3IONS-SCNC: 11.9 MMOL/L (ref 5–15)
AST SERPL-CCNC: 12 U/L (ref 1–32)
BASOPHILS # BLD AUTO: 0.06 10*3/MM3 (ref 0–0.2)
BASOPHILS NFR BLD AUTO: 0.9 % (ref 0–1.5)
BILIRUB SERPL-MCNC: 0.3 MG/DL (ref 0–1.2)
BUN SERPL-MCNC: 12 MG/DL (ref 6–20)
BUN/CREAT SERPL: 21.1 (ref 7–25)
CALCIUM SPEC-SCNC: 9.2 MG/DL (ref 8.6–10.5)
CHLORIDE SERPL-SCNC: 100 MMOL/L (ref 98–107)
CK SERPL-CCNC: 21 U/L (ref 20–180)
CO2 SERPL-SCNC: 25.1 MMOL/L (ref 22–29)
CREAT SERPL-MCNC: 0.57 MG/DL (ref 0.57–1)
CRP SERPL-MCNC: <0.3 MG/DL (ref 0–0.5)
DEPRECATED RDW RBC AUTO: 51.8 FL (ref 37–54)
EGFRCR SERPLBLD CKD-EPI 2021: 121 ML/MIN/1.73
EOSINOPHIL # BLD AUTO: 0.36 10*3/MM3 (ref 0–0.4)
EOSINOPHIL NFR BLD AUTO: 5.4 % (ref 0.3–6.2)
ERYTHROCYTE [DISTWIDTH] IN BLOOD BY AUTOMATED COUNT: 17.2 % (ref 12.3–15.4)
GLOBULIN UR ELPH-MCNC: 2.8 GM/DL
GLUCOSE SERPL-MCNC: 79 MG/DL (ref 65–99)
HCT VFR BLD AUTO: 33.4 % (ref 34–46.6)
HGB BLD-MCNC: 11 G/DL (ref 12–15.9)
IMM GRANULOCYTES # BLD AUTO: 0.01 10*3/MM3 (ref 0–0.05)
IMM GRANULOCYTES NFR BLD AUTO: 0.2 % (ref 0–0.5)
LYMPHOCYTES # BLD AUTO: 1.57 10*3/MM3 (ref 0.7–3.1)
LYMPHOCYTES NFR BLD AUTO: 23.6 % (ref 19.6–45.3)
MCH RBC QN AUTO: 28.6 PG (ref 26.6–33)
MCHC RBC AUTO-ENTMCNC: 32.9 G/DL (ref 31.5–35.7)
MCV RBC AUTO: 87 FL (ref 79–97)
MONOCYTES # BLD AUTO: 0.67 10*3/MM3 (ref 0.1–0.9)
MONOCYTES NFR BLD AUTO: 10.1 % (ref 5–12)
NEUTROPHILS NFR BLD AUTO: 3.99 10*3/MM3 (ref 1.7–7)
NEUTROPHILS NFR BLD AUTO: 59.8 % (ref 42.7–76)
NRBC BLD AUTO-RTO: 0 /100 WBC (ref 0–0.2)
PLATELET # BLD AUTO: 323 10*3/MM3 (ref 140–450)
PMV BLD AUTO: 8.6 FL (ref 6–12)
POTASSIUM SERPL-SCNC: 3.5 MMOL/L (ref 3.5–5.2)
PROT SERPL-MCNC: 7.3 G/DL (ref 6–8.5)
RBC # BLD AUTO: 3.84 10*6/MM3 (ref 3.77–5.28)
SODIUM SERPL-SCNC: 137 MMOL/L (ref 136–145)
WBC NRBC COR # BLD: 6.66 10*3/MM3 (ref 3.4–10.8)

## 2023-05-08 PROCEDURE — 82550 ASSAY OF CK (CPK): CPT | Performed by: INTERNAL MEDICINE

## 2023-05-08 PROCEDURE — 86140 C-REACTIVE PROTEIN: CPT | Performed by: INTERNAL MEDICINE

## 2023-05-08 PROCEDURE — 80053 COMPREHEN METABOLIC PANEL: CPT | Performed by: INTERNAL MEDICINE

## 2023-05-08 PROCEDURE — 85025 COMPLETE CBC W/AUTO DIFF WBC: CPT | Performed by: INTERNAL MEDICINE

## 2023-05-14 ENCOUNTER — HOSPITAL ENCOUNTER (EMERGENCY)
Facility: HOSPITAL | Age: 38
Discharge: HOME | End: 2023-05-14
Attending: EMERGENCY MEDICINE | Admitting: EMERGENCY MEDICINE
Payer: OTHER GOVERNMENT

## 2023-05-14 VITALS
OXYGEN SATURATION: 99 % | DIASTOLIC BLOOD PRESSURE: 87 MMHG | BODY MASS INDEX: 19.2 KG/M2 | RESPIRATION RATE: 20 BRPM | HEART RATE: 87 BPM | SYSTOLIC BLOOD PRESSURE: 120 MMHG | HEIGHT: 67 IN | WEIGHT: 122.36 LBS | TEMPERATURE: 97.8 F

## 2023-05-14 DIAGNOSIS — L50.9 URTICARIA: Primary | ICD-10-CM

## 2023-05-14 PROCEDURE — 25010000002 DEXAMETHASONE SODIUM PHOSPHATE 10 MG/ML SOLUTION: Performed by: NURSE PRACTITIONER

## 2023-05-14 PROCEDURE — 96372 THER/PROPH/DIAG INJ SC/IM: CPT

## 2023-05-14 PROCEDURE — 99282 EMERGENCY DEPT VISIT SF MDM: CPT

## 2023-05-14 RX ORDER — DEXAMETHASONE SODIUM PHOSPHATE 10 MG/ML
10 INJECTION, SOLUTION INTRAMUSCULAR; INTRAVENOUS ONCE
Status: COMPLETED | OUTPATIENT
Start: 2023-05-14 | End: 2023-05-14

## 2023-05-14 RX ADMIN — DEXAMETHASONE SODIUM PHOSPHATE 10 MG: 10 INJECTION INTRAMUSCULAR; INTRAVENOUS at 05:30

## 2023-05-14 NOTE — ED PROVIDER NOTES
"Time: 4:59 AM EDT  Date of encounter:  5/14/2023  Independent Historian/Clinical History and Information was obtained by:   Patient  Chief Complaint: Hives    History is limited by: N/A    History of Present Illness:  Patient is a 37 y.o. year old female who presents to the emergency department for evaluation of hives.  Patient has recently been getting treated for sepsis and sacral ileitis outpatient with IV antibiotics given through PICC line at home.  For the past 2 to 3 weeks patient has been experiencing rash and has been getting prophylactically treated with Benadryl and a Medrol Dosepak.  This had kept the rash to a minimum until Medrol Dosepak ran out.  Patient was then placed on daily dose of Methylprednisone after a telehealth visit with PCP this week.  Patient concerned because right thigh had broke out in hives and she does not want to take her last dose of antibiotics today because she feels like she has had to \"clear her throat more\"    HPI    Patient Care Team  Primary Care Provider: Bunny Galvan MD    Past Medical History:     Allergies   Allergen Reactions   • Bactrim [Sulfamethoxazole-Trimethoprim] Hives   • Hazelnut (Filbert) Allergy Skin Test Hives   • Penicillins Hives   • Shellfish-Derived Products Hives   • Sulfa Antibiotics Hives   • Daptomycin Rash   • Vancomycin Rash     Past Medical History:   Diagnosis Date   • PAC (premature atrial contraction)      Past Surgical History:   Procedure Laterality Date   • EAR TUBES     • MOLE REMOVAL       Family History   Problem Relation Age of Onset   • Hypertension Father    • Stroke Father    • Hypertension Mother    • Breast cancer Paternal Grandmother    • Lung cancer Paternal Grandfather        Home Medications:  Prior to Admission medications    Medication Sig Start Date End Date Taking? Authorizing Provider   famotidine (PEPCID) 20 MG tablet Take 1 tablet by mouth Daily. 8/29/22   Thu Solorio APRN   ibuprofen (ADVIL,MOTRIN) 600 MG " "tablet Take 1 tablet by mouth Every 6 (Six) Hours As Needed for Mild Pain . 4/13/22   Alejandro Crews MD   ondansetron ODT (ZOFRAN-ODT) 8 MG disintegrating tablet Place 1 tablet on the tongue Every 8 (Eight) Hours As Needed for Vomiting or Nausea. 4/22/23   Omer Pearson DO   Prenatal Vit-Fe Fumarate-FA (prenatal vitamin 27-0.8) 27-0.8 MG tablet tablet Take 1 tablet by mouth Daily.    Provider, Keanu, MD        Social History:   Social History     Tobacco Use   • Smoking status: Never   • Smokeless tobacco: Never   Substance Use Topics   • Alcohol use: Never   • Drug use: Never         Review of Systems:  Review of Systems   Constitutional: Negative for chills and fever.   HENT: Negative for congestion, drooling, ear pain and sore throat.    Eyes: Negative for pain.   Respiratory: Negative for cough, chest tightness, shortness of breath and wheezing.    Cardiovascular: Negative for chest pain.   Gastrointestinal: Negative for abdominal pain, diarrhea, nausea and vomiting.   Genitourinary: Negative for flank pain and hematuria.   Musculoskeletal: Negative for joint swelling.   Skin: Positive for rash. Negative for pallor.   Neurological: Negative for seizures and headaches.   Hematological: Negative.    Psychiatric/Behavioral: Negative.    All other systems reviewed and are negative.       Physical Exam:  /87   Pulse 87   Temp 97.8 °F (36.6 °C)   Resp 20   Ht 170.2 cm (67\")   Wt 55.5 kg (122 lb 5.7 oz)   SpO2 99%   BMI 19.16 kg/m²     Physical Exam  Vitals and nursing note reviewed.   Constitutional:       General: She is not in acute distress.     Appearance: Normal appearance. She is not toxic-appearing.   HENT:      Head: Normocephalic and atraumatic.      Right Ear: Tympanic membrane, ear canal and external ear normal.      Left Ear: Tympanic membrane, ear canal and external ear normal.      Nose: Nose normal.      Mouth/Throat:      Mouth: Mucous membranes are moist.      Pharynx: No posterior " oropharyngeal erythema.      Comments: No oral swelling.  Uvula midline and nonedematous.  No angioedema  Eyes:      General: No scleral icterus.     Extraocular Movements: Extraocular movements intact.      Conjunctiva/sclera: Conjunctivae normal.      Pupils: Pupils are equal, round, and reactive to light.   Cardiovascular:      Rate and Rhythm: Normal rate and regular rhythm.      Pulses: Normal pulses.      Heart sounds: Normal heart sounds.   Pulmonary:      Effort: Pulmonary effort is normal. No respiratory distress.      Breath sounds: Normal breath sounds.   Abdominal:      General: Abdomen is flat. Bowel sounds are normal.      Palpations: Abdomen is soft.      Tenderness: There is no abdominal tenderness.   Musculoskeletal:         General: Normal range of motion.      Cervical back: Normal range of motion and neck supple.   Lymphadenopathy:      Cervical: No cervical adenopathy.   Skin:     General: Skin is warm and dry.      Capillary Refill: Capillary refill takes less than 2 seconds.      Findings: Rash ( Very faint scant areas of urticaria noted to right hip and left arm.  Patient reports she had taken Benadryl and it has greatly improved) present.   Neurological:      Mental Status: She is alert and oriented to person, place, and time. Mental status is at baseline.   Psychiatric:         Mood and Affect: Mood normal.         Behavior: Behavior normal.          Procedures:  Procedures      Medical Decision Making:      Comorbidities that affect care:    Sacroiliitis    External Notes reviewed:    Previous Clinic Note: Most recent infectious disease notes from Dr. LAWANDA bates noted and reviewed      The following orders were placed and all results were independently analyzed by me:  No orders of the defined types were placed in this encounter.      Medications Given in the Emergency Department:  Medications   dexamethasone sodium phosphate injection 10 mg (10 mg Intramuscular Given 5/14/23 2874)         ED Course:         Labs:    Lab Results (last 24 hours)     ** No results found for the last 24 hours. **           Imaging:    No Radiology Exams Resulted Within Past 24 Hours      Differential Diagnosis and Discussion:    Rash: Differential diagnosis includes but is not limited to sepsis, cellulitis, [place] Spotted Fever, meningitis, meningococcemia, Varicella, Strep infection, dermatitis, allergic reaction, Lyme disease, and toxic shock syndrome.      MDM  Number of Diagnoses or Management Options  Urticaria  Diagnosis management comments: The patient is now resting comfortably, and feels better, is alert, is not toxic, and is in no distress. The patient has a normal mental status and is neurologically intact. The rash does not have petechiae or purpura. There are no mucous membrane lesions, no signs of abscess, and no bullae. The patient appears well, has no fever, altered mental status for signs of systemic toxicity. The history, exam, diagnostic testing (if any) and current conditions do not demonstrate any signs of sepsis, [place] Spotted Fever, meningitis, meningococcemia, Lyme disease, toxic shock syndrome or other significant systemic illness requiring further treatment, testing or consultation in the emergency department. The vital signs have been stable. The patient's condition is stable and appropriate for discharge. The patient will pursue further outpatient evaluation with the primary care physician or other designated or consulting physician as indicated in the discharge instructions.          Amount and/or Complexity of Data Reviewed  Tests in the medicine section of CPT®: ordered and reviewed  Decide to obtain previous medical records or to obtain history from someone other than the patient: yes  Review and summarize past medical records: yes (Reviewed infectious disease notes)    Risk of Complications, Morbidity, and/or Mortality  Presenting problems: low  Management options:  low    Patient Progress  Patient progress: stable         Patient Care Considerations:    I considered prescribing steroids but since antibiotic would be stopped and offending agent will be removed.  Patient's methylprednisone should be enough and she can consult with her infectious disease doctor about what other medicines he would like to continue      Consultants/Shared Management Plan:    None    Social Determinants of Health:    Patient is independent, reliable, and has access to care.       Disposition and Care Coordination:    Discharged: The patient is suitable and stable for discharge with no need for consideration of observation or admission.    I have explained the patient´s condition, diagnoses and treatment plan based on the information available to me at this time. I have answered questions and addressed any concerns. The patient has a good  understanding of the patient´s diagnosis, condition, and treatment plan as can be expected at this point. The vital signs have been stable. The patient´s condition is stable and appropriate for discharge from the emergency department.      The patient will pursue further outpatient evaluation with the primary care physician or other designated or consulting physician as outlined in the discharge instructions. They are agreeable to this plan of care and follow-up instructions have been explained in detail. The patient has received these instructions in written format and have expressed an understanding of the discharge instructions. The patient is aware that any significant change in condition or worsening of symptoms should prompt an immediate return to this or the closest emergency department or call to 911.  I have explained discharge medications and the need for follow up with the patient/caretakers. This was also printed in the discharge instructions. Patient was discharged with the following medications and follow up:      Medication List      No changes were  made to your prescriptions during this visit.      Xiang Vasquez MD  [address]  [place] [address]  [phone]    Call in 1 day         Final diagnoses:   Urticaria        ED Disposition     ED Disposition   Discharge    Condition   Stable    Comment   --             This medical record created using voice recognition software.           Meaghan Doran, APRN  05/14/23 0559

## 2023-05-14 NOTE — DISCHARGE INSTRUCTIONS
Hold her last dose of antibiotics today since urticaria has continued and you are having the sensation of having to clear your throat.  Discussed with your home health and infectious disease   Tomorrow    Continue Benadryl and Medrol dose as previously prescribed

## 2023-06-08 DIAGNOSIS — M25.552 LEFT HIP PAIN: Primary | ICD-10-CM
